# Patient Record
Sex: FEMALE | Race: WHITE | Employment: STUDENT | URBAN - METROPOLITAN AREA
[De-identification: names, ages, dates, MRNs, and addresses within clinical notes are randomized per-mention and may not be internally consistent; named-entity substitution may affect disease eponyms.]

---
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
## 2020-01-24 ENCOUNTER — OFFICE VISIT (OUTPATIENT)
Dept: UROLOGY | Facility: HOSPITAL | Age: 20
End: 2020-01-24
Attending: OBSTETRICS & GYNECOLOGY
Payer: COMMERCIAL

## 2020-01-24 VITALS
HEIGHT: 68 IN | BODY MASS INDEX: 20.92 KG/M2 | WEIGHT: 138 LBS | DIASTOLIC BLOOD PRESSURE: 66 MMHG | SYSTOLIC BLOOD PRESSURE: 102 MMHG

## 2020-01-24 DIAGNOSIS — N94.10 DYSPAREUNIA, FEMALE: ICD-10-CM

## 2020-01-24 DIAGNOSIS — R39.15 URINARY URGENCY: ICD-10-CM

## 2020-01-24 DIAGNOSIS — R35.1 NOCTURIA: ICD-10-CM

## 2020-01-24 DIAGNOSIS — R35.0 URINARY FREQUENCY: Primary | ICD-10-CM

## 2020-01-24 LAB
CONTROL RUN WITHIN 24 HOURS?: YES
LEUKOCYTE ESTERASE URINE: NEGATIVE
NITRITE URINE: NEGATIVE

## 2020-01-24 PROCEDURE — 99212 OFFICE O/P EST SF 10 MIN: CPT

## 2020-01-24 PROCEDURE — 87086 URINE CULTURE/COLONY COUNT: CPT | Performed by: OBSTETRICS & GYNECOLOGY

## 2020-01-24 PROCEDURE — 81002 URINALYSIS NONAUTO W/O SCOPE: CPT

## 2020-01-24 NOTE — PROGRESS NOTES
Caro Antonio,   1/24/2020     Referred by Dr. Jeffery Garcia  Pt here with mother    Patient presents with:  Bladder Problem: bladder pain  Urinary Frequency    She c/o urinary urgency and frequency, sx worse before her cycle  Pain as her bladder fills needed. , Disp: , Rfl: 0  Amitriptyline HCl 75 MG Oral Tab, Take 75 mg by mouth., Disp: , Rfl: 0    No facility-administered medications prior to visit.        Urogynecology Summary:  Urogynecology Summary  Prolapse: No  CAROL: No  Urge Incontinence: Yes  No and nonpharmacologic mgmt options for urinary symptoms. Discussed dietary & weight management with potential improvements in symptoms with weight loss.     Diagnostic Items:  urine culture (ua when not on cycle)    Medications Discussed:  Fiber  Miralax

## 2020-01-27 RX ORDER — AMITRIPTYLINE HYDROCHLORIDE 100 MG/1
TABLET, FILM COATED ORAL
COMMUNITY
Start: 2019-11-25 | End: 2020-02-03 | Stop reason: ALTCHOICE

## 2020-01-27 RX ORDER — SULFACETAMIDE SODIUM, SULFUR 98; 48 MG/G; MG/G
LOTION TOPICAL
COMMUNITY
Start: 2019-10-08 | End: 2021-02-12 | Stop reason: ALTCHOICE

## 2020-01-27 RX ORDER — METRONIDAZOLE 500 MG/1
TABLET ORAL
COMMUNITY
Start: 2019-10-06 | End: 2021-01-08 | Stop reason: ALTCHOICE

## 2020-01-27 RX ORDER — BUSPIRONE HYDROCHLORIDE 10 MG/1
TABLET ORAL
COMMUNITY
Start: 2019-12-17 | End: 2020-10-09 | Stop reason: ALTCHOICE

## 2020-01-27 RX ORDER — NORGESTIMATE AND ETHINYL ESTRADIOL 7DAYSX3 28
KIT ORAL
COMMUNITY
Start: 2019-12-25 | End: 2020-03-20

## 2020-01-27 RX ORDER — CICLOPIROX 1 G/100ML
SHAMPOO TOPICAL
COMMUNITY
Start: 2019-12-18

## 2020-02-03 ENCOUNTER — OFFICE VISIT (OUTPATIENT)
Dept: SURGERY | Facility: CLINIC | Age: 20
End: 2020-02-03
Payer: COMMERCIAL

## 2020-02-03 VITALS
BODY MASS INDEX: 20.92 KG/M2 | HEART RATE: 110 BPM | WEIGHT: 138 LBS | SYSTOLIC BLOOD PRESSURE: 97 MMHG | DIASTOLIC BLOOD PRESSURE: 66 MMHG | RESPIRATION RATE: 16 BRPM | OXYGEN SATURATION: 99 % | HEIGHT: 68 IN

## 2020-02-03 DIAGNOSIS — N64.52 BILATERAL NIPPLE DISCHARGE: Primary | ICD-10-CM

## 2020-02-03 PROCEDURE — 99204 OFFICE O/P NEW MOD 45 MIN: CPT | Performed by: SURGERY

## 2020-02-03 RX ORDER — SWAB
SWAB, NON-MEDICATED MISCELLANEOUS
COMMUNITY
End: 2021-02-12 | Stop reason: ALTCHOICE

## 2020-02-03 RX ORDER — CLINDAMYCIN PHOSPHATE AND BENZOYL PEROXIDE 10; 37.5 MG/G; MG/G
GEL TOPICAL
COMMUNITY
Start: 2020-01-23 | End: 2021-02-12 | Stop reason: ALTCHOICE

## 2020-02-03 RX ORDER — CLONAZEPAM 0.5 MG/1
TABLET, ORALLY DISINTEGRATING ORAL
COMMUNITY
Start: 2020-01-29 | End: 2021-01-08

## 2020-02-04 NOTE — PROGRESS NOTES
Breast Surgery New Patient Consultation    This is the first visit for this 23year old woman, referred by Dr. Delphine Romo, who presents for evaluation of nipple discharge.     History of Present Illness:   Ms. Odell Melchor is a 23year old woman who present Disp: , Rfl:   metRONIDAZOLE 500 MG Oral Tab, , Disp: , Rfl:   TRI FEMYNOR 0.18/0.215/0.25 MG-35 MCG Oral Tab, , Disp: , Rfl:   PLEXION 9.8-4.8 % External Lotion, APPLY TO face TWICE DAILY, Disp: , Rfl:   amphetamine-dextroamphetamine 10 MG Oral Tab, TAKE pain, chest pressure/discomfort, palpitations, irregular heartbeat, fainting or near-fainting, difficulty breathing when lying flat, SOB/Coughing at night, swelling of the legs or chest pain while walking.     Breasts:  See history of present illness    Gas kg/m²     Physical Exam:  The patient is an alert, oriented, well-nourished and  well-developed woman who appears her stated age. Her speech patterns and movements are normal. Her affect is appropriate. HEENT: The head is normocephalic.  The neck is supp nipple discharge. Discussion and Plan:  I had a discussion with the Patient and Family Member regarding her breast exam. On exam today I found expressible discharge from her right breast only with no other clinical findings.   This does not appear to be

## 2020-02-17 ENCOUNTER — OFFICE VISIT (OUTPATIENT)
Dept: OPTOMETRY | Facility: CLINIC | Age: 20
End: 2020-02-17
Payer: COMMERCIAL

## 2020-02-17 ENCOUNTER — OFFICE VISIT (OUTPATIENT)
Dept: PHYSICAL THERAPY | Age: 20
End: 2020-02-17
Attending: OBSTETRICS & GYNECOLOGY
Payer: COMMERCIAL

## 2020-02-17 DIAGNOSIS — N94.10 DYSPAREUNIA, FEMALE: ICD-10-CM

## 2020-02-17 DIAGNOSIS — H52.13 MYOPIA OF BOTH EYES: Primary | ICD-10-CM

## 2020-02-17 DIAGNOSIS — R35.1 NOCTURIA: ICD-10-CM

## 2020-02-17 DIAGNOSIS — R39.15 URINARY URGENCY: ICD-10-CM

## 2020-02-17 DIAGNOSIS — R35.0 URINARY FREQUENCY: ICD-10-CM

## 2020-02-17 PROCEDURE — 97112 NEUROMUSCULAR REEDUCATION: CPT

## 2020-02-17 PROCEDURE — 97162 PT EVAL MOD COMPLEX 30 MIN: CPT

## 2020-02-17 PROCEDURE — 97535 SELF CARE MNGMENT TRAINING: CPT

## 2020-02-17 PROCEDURE — 92015 DETERMINE REFRACTIVE STATE: CPT | Performed by: OPTOMETRIST

## 2020-02-17 PROCEDURE — 92012 INTRM OPH EXAM EST PATIENT: CPT | Performed by: OPTOMETRIST

## 2020-02-17 RX ORDER — PROPRANOLOL HYDROCHLORIDE 10 MG/1
TABLET ORAL
COMMUNITY
Start: 2020-02-03 | End: 2021-02-12 | Stop reason: ALTCHOICE

## 2020-02-17 NOTE — PROGRESS NOTES
Oni Aranda is a 23year old female. HPI:     HPI     Patient is in for an annual contact lens exam. She has been wearing [address] but they feel dry after 5 minutes of wear.  She is taking a lot of medications that are making her mouth Oral Tab      • TRI FEMYNOR 0.18/0.215/0.25 MG-35 MCG Oral Tab      • PLEXION 9.8-4.8 % External Lotion APPLY TO face TWICE DAILY     • amphetamine-dextroamphetamine 10 MG Oral Tab TAKE 1 TABLET BY MOUTH EVERY DAY AS NEEDED  0   • L-Methylfolate 7.5 MG [place] [facility] [place]    Right -2.25 -0.50 180 20/20    Left -2.25 -0.50 180 20/20          Final Rx       Sphere Cylinder [place] Dist VA    Right -2.25 -0.50 180 20/20    Left -2.25 -0.50 180 20/20    Type:  Single vision            Contact Lens Exam     Current Contact

## 2020-02-17 NOTE — ASSESSMENT & PLAN NOTE
New glasses RX given to update as needed. Gave trials of the [employer] Aqua Comfort Plus lenses and she will get back to me.

## 2020-02-17 NOTE — PATIENT INSTRUCTIONS
Myopia  New glasses RX given to update as needed. Gave trials of the [employer] Aqua Comfort Plus lenses and she will get back to me.

## 2020-02-17 NOTE — PROGRESS NOTES
PELVIC FLOOR EVALUATION:   Referring Physician: Dr. Yovany Taylor  Diagnosis:    Urinary frequency (R35.0)  Urinary urgency (R39.15)  Nocturia (R35.1)  Dyspareunia, female (N94.10)   Date of onset: chronic Date of Service: 2/17/2020     PATIENT SUMMARY   Au chronic  Abdominal/Vaginal Pressure complaints: no  Urinary Frequency: every hour  Urine Stream: varies  Amount: feels like she doesn't pee enough for what she drinks and vice versa  Leaking occurs: no  Episodes of Leakage: no  Amount of leakage: NA  Pad u Motion  Lumbar AROM screen: WFL  LE AROM screen: grossly WNL     Strength (MMT) 5/5 AFIA LE  Transverse Abdominis: 4/5    Flexibility Summary: WNL AFIA LE    Special Tests: WNL    Functional screen:  Double leg squat:wfl  Single leg squat:wfl  Single leg sta diaphragmatic breathing for PNS activation and pelvic floor relaxation     Patient was instructed in and issued a HEP for:     Charges: PT Eval Moderate Complexity, 1 self care, 1NM      Total Timed Treatment: 30 min     Total Treatment Time: 80 min     Ba If you have any questions, please contact me at Dept: [phone]    Sincerely,  Electronically signed by therapist: Cindy Amor, PT  [de-identified] certification required: Yes  I certify the need for these services furnished under this plan of treatment

## 2020-02-21 ENCOUNTER — TELEPHONE (OUTPATIENT)
Dept: OPTOMETRY | Facility: CLINIC | Age: 20
End: 2020-02-21

## 2020-02-27 ENCOUNTER — OFFICE VISIT (OUTPATIENT)
Dept: PHYSICAL THERAPY | Age: 20
End: 2020-02-27
Attending: OBSTETRICS & GYNECOLOGY
Payer: COMMERCIAL

## 2020-02-27 PROCEDURE — 97110 THERAPEUTIC EXERCISES: CPT

## 2020-02-27 PROCEDURE — 97140 MANUAL THERAPY 1/> REGIONS: CPT

## 2020-02-27 NOTE — PROGRESS NOTES
Dx: Urinary frequency (R35.0)  Urinary urgency (R39.15)  Nocturia (R35.1)  Dyspareunia, female (N94.10)          Insurance (Authorized # of Visits):  Cigna 10 per POC       Authorizing Physician: Dr. Burak Figueroa Next MD visit: none scheduled  Fall Risk: marisela HEP: no change    Charges: 2MAN, 1TE     Total Timed Treatment: 40 min  Total Treatment Time: 45 min

## 2020-03-02 ENCOUNTER — APPOINTMENT (OUTPATIENT)
Dept: PHYSICAL THERAPY | Age: 20
End: 2020-03-02
Attending: OBSTETRICS & GYNECOLOGY
Payer: COMMERCIAL

## 2020-03-05 ENCOUNTER — TELEPHONE (OUTPATIENT)
Dept: SURGERY | Facility: CLINIC | Age: 20
End: 2020-03-05

## 2020-03-05 ENCOUNTER — HOSPITAL ENCOUNTER (OUTPATIENT)
Dept: ULTRASOUND IMAGING | Facility: HOSPITAL | Age: 20
Discharge: HOME | End: 2020-03-05
Attending: SURGERY
Payer: COMMERCIAL

## 2020-03-05 DIAGNOSIS — N64.52 BILATERAL NIPPLE DISCHARGE: ICD-10-CM

## 2020-03-05 PROCEDURE — 76642 ULTRASOUND BREAST LIMITED: CPT | Performed by: SURGERY

## 2020-03-05 NOTE — TELEPHONE ENCOUNTER
Called pt to relay results. No answer, left VM after confirming verbal release. Per Dr. Alexander Barba, \"I reviewed and all looks ok. Have her see me for clinical exam only in 6 months. Nothing to do for the discharge right now. \"  Provided number to call for t

## 2020-03-12 ENCOUNTER — OFFICE VISIT (OUTPATIENT)
Dept: PHYSICAL THERAPY | Age: 20
End: 2020-03-12
Attending: OBSTETRICS & GYNECOLOGY
Payer: COMMERCIAL

## 2020-03-12 PROCEDURE — 97140 MANUAL THERAPY 1/> REGIONS: CPT

## 2020-03-12 PROCEDURE — 97110 THERAPEUTIC EXERCISES: CPT

## 2020-03-12 NOTE — PROGRESS NOTES
Dx: Urinary frequency (R35.0)  Urinary urgency (R39.15)  Nocturia (R35.1)  Dyspareunia, female (N94.10)          Insurance (Authorized # of Visits):  Cigna 10 per POC       Authorizing Physician: Dr. Jose Daniel Suarez Next MD visit: none scheduled  Fall Risk: marisela release to PF layers 1,2,3       NeuroMuscular Jeanine  Diaphragmatic breathing 3 min NeuroMuscular Jeanine  Diaphragmatic breathing 3 min      Self care Home management:       HEP: no change    Charges: 2MAN, 1TE     Total Timed Treatment: 40 min  Total Treatme

## 2020-03-16 ENCOUNTER — OFFICE VISIT (OUTPATIENT)
Dept: PHYSICAL THERAPY | Age: 20
End: 2020-03-16
Attending: OBSTETRICS & GYNECOLOGY
Payer: COMMERCIAL

## 2020-03-16 PROCEDURE — 97140 MANUAL THERAPY 1/> REGIONS: CPT

## 2020-03-16 NOTE — PROGRESS NOTES
Dx: Urinary frequency (R35.0)  Urinary urgency (R39.15)  Nocturia (R35.1)  Dyspareunia, female (N94.10)          Insurance (Authorized # of Visits):  Cigna 10 per POC       Authorizing Physician: Dr. Saulo Hanks MD visit: none scheduled  Fall Risk: marisela and TP release to PF layers 1,2,3  Manual:  DTM and TP release to PF layers 1,2,3   Bowel massage     NeuroMuscular Jeanine  Diaphragmatic breathing 3 min NeuroMuscular Jeanine  Diaphragmatic breathing 3 min      Self care Home management:       Connie Narayanan

## 2020-03-23 ENCOUNTER — APPOINTMENT (OUTPATIENT)
Dept: PHYSICAL THERAPY | Age: 20
End: 2020-03-23
Attending: OBSTETRICS & GYNECOLOGY
Payer: COMMERCIAL

## 2020-03-26 ENCOUNTER — TELEPHONE (OUTPATIENT)
Dept: PHYSICAL THERAPY | Age: 20
End: 2020-03-26

## 2020-03-30 ENCOUNTER — APPOINTMENT (OUTPATIENT)
Dept: PHYSICAL THERAPY | Age: 20
End: 2020-03-30
Attending: OBSTETRICS & GYNECOLOGY
Payer: COMMERCIAL

## 2020-04-06 ENCOUNTER — APPOINTMENT (OUTPATIENT)
Dept: PHYSICAL THERAPY | Age: 20
End: 2020-04-06
Attending: OBSTETRICS & GYNECOLOGY
Payer: COMMERCIAL

## 2020-04-24 ENCOUNTER — TELEPHONE (OUTPATIENT)
Dept: UROLOGY | Facility: HOSPITAL | Age: 20
End: 2020-04-24

## 2020-05-01 ENCOUNTER — VIRTUAL PHONE E/M (OUTPATIENT)
Dept: UROLOGY | Facility: HOSPITAL | Age: 20
End: 2020-05-01
Attending: OBSTETRICS & GYNECOLOGY
Payer: COMMERCIAL

## 2020-05-01 VITALS — BODY MASS INDEX: 20.92 KG/M2 | HEIGHT: 68 IN | WEIGHT: 138 LBS

## 2020-05-01 DIAGNOSIS — K59.00 CONSTIPATION, UNSPECIFIED CONSTIPATION TYPE: ICD-10-CM

## 2020-05-01 DIAGNOSIS — N94.10 DYSPAREUNIA, FEMALE: Primary | ICD-10-CM

## 2020-05-01 DIAGNOSIS — R39.15 URINARY URGENCY: ICD-10-CM

## 2020-05-01 DIAGNOSIS — R35.1 NOCTURIA: ICD-10-CM

## 2020-05-01 DIAGNOSIS — R35.0 URINARY FREQUENCY: ICD-10-CM

## 2020-05-01 NOTE — PATIENT INSTRUCTIONS
[facility]   [facility]    BOWEL REGIMEN    Constipation can have detrimental effects on bladder function and can worsen the symptoms of prolapse. It is important to avoid constipation.     The first step for treating co

## 2020-05-01 NOTE — PROGRESS NOTES
Patient presents to follow up nocturia, dyspareunia    She is currently using pelvic floor PT x4 (stopped given covid)    She reports 15% improvement, zachary with urinary frequency  Her biggest complaint now is painful intercourse  Goal currently is related t

## 2020-06-03 ENCOUNTER — APPOINTMENT (OUTPATIENT)
Dept: PHYSICAL THERAPY | Age: 20
End: 2020-06-03
Attending: OBSTETRICS & GYNECOLOGY
Payer: COMMERCIAL

## 2020-06-10 ENCOUNTER — APPOINTMENT (OUTPATIENT)
Dept: PHYSICAL THERAPY | Age: 20
End: 2020-06-10
Attending: OBSTETRICS & GYNECOLOGY
Payer: COMMERCIAL

## 2020-09-01 ENCOUNTER — OFFICE VISIT (OUTPATIENT)
Dept: PODIATRY CLINIC | Facility: CLINIC | Age: 20
End: 2020-09-01
Payer: COMMERCIAL

## 2020-09-01 DIAGNOSIS — L60.0 INGROWN TOENAIL OF BOTH FEET: Primary | ICD-10-CM

## 2020-09-01 PROCEDURE — 99202 OFFICE O/P NEW SF 15 MIN: CPT | Performed by: PODIATRIST

## 2020-09-01 RX ORDER — BUPROPION HYDROCHLORIDE 150 MG/1
150 TABLET ORAL EVERY MORNING
COMMUNITY
Start: 2020-03-24 | End: 2020-10-09

## 2020-09-01 RX ORDER — GUANFACINE 2 MG/1
2 TABLET, EXTENDED RELEASE ORAL DAILY
COMMUNITY
Start: 2020-07-14 | End: 2021-01-08

## 2020-09-01 NOTE — PROGRESS NOTES
HPI:    Patient ID: Renee Zimmer is a 23year old female. This pleasant 63-year-old female presents as a new patient to me and is accompanied by her mom. They state that they are self-referred.   The reason for this visit is chronic pain associated daily.     • buPROPion HCl ER, XL, 150 MG Oral Tablet 24 Hr Take 150 mg by mouth every morning. Allergies:No Known Allergies   PHYSICAL EXAM:   Physical exam pulses are normal.  There is no evidence of edema nor erythema.   There is marked incurvation

## 2020-09-08 ENCOUNTER — OFFICE VISIT (OUTPATIENT)
Dept: PODIATRY CLINIC | Facility: CLINIC | Age: 20
End: 2020-09-08
Payer: COMMERCIAL

## 2020-09-08 VITALS — SYSTOLIC BLOOD PRESSURE: 90 MMHG | HEART RATE: 101 BPM | DIASTOLIC BLOOD PRESSURE: 58 MMHG

## 2020-09-08 DIAGNOSIS — L60.0 INGROWN TOENAIL OF BOTH FEET: Primary | ICD-10-CM

## 2020-09-08 PROCEDURE — 11750 EXCISION NAIL&NAIL MATRIX: CPT | Performed by: PODIATRIST

## 2020-09-08 PROCEDURE — 3078F DIAST BP <80 MM HG: CPT | Performed by: PODIATRIST

## 2020-09-08 PROCEDURE — 3074F SYST BP LT 130 MM HG: CPT | Performed by: PODIATRIST

## 2020-09-08 NOTE — PROCEDURES
Per verbal order from SCR, draw up 1.5ml of 0.5% Marcaine and 1.5ml of 2% lidocaine for injection to bilateral great toes Jay Lee RN  Patient left office prior to obtaining post procedure vitals.

## 2020-09-08 NOTE — PROGRESS NOTES
HPI:    Patient ID: Rosemarie Mitchell is a 23year old female. 25year-old female presents for correction of ingrown toenail. I reviewed the procedures and after discussion patient signed a written consent.     ROS:   I reviewed medical status medicati borders of each great toe. After partial avulsion phenol was applied to the matrix area followed by a Betadine ointment dressing. She was given instruction to keep them dry for 1 day and then begin soaking and apply a Band-Aid.   She is well-informed and

## 2020-09-10 ENCOUNTER — TELEPHONE (OUTPATIENT)
Dept: PODIATRY CLINIC | Facility: CLINIC | Age: 20
End: 2020-09-10

## 2020-09-10 NOTE — TELEPHONE ENCOUNTER
Pt needs f/u at [facility] next week post procedure - no openings

## 2020-09-18 ENCOUNTER — OFFICE VISIT (OUTPATIENT)
Dept: PODIATRY CLINIC | Facility: CLINIC | Age: 20
End: 2020-09-18
Payer: COMMERCIAL

## 2020-09-18 DIAGNOSIS — L60.0 INGROWN TOENAIL OF BOTH FEET: Primary | ICD-10-CM

## 2020-09-18 PROCEDURE — 99024 POSTOP FOLLOW-UP VISIT: CPT | Performed by: PODIATRIST

## 2020-09-18 NOTE — PROGRESS NOTES
HPI:    Patient ID: Caden Lane is a 23year old female. This 22-year-old female presents post ingrown toenail procedure of both great toes. Patient has some discomfort.   On evaluation slight draining noted is consistent with the procedure there i orders of the defined types were placed in this encounter.       Meds This Visit:  Requested Prescriptions      No prescriptions requested or ordered in this encounter       Imaging & Referrals:  None       NQ#[id number]

## 2020-10-09 ENCOUNTER — OFFICE VISIT (OUTPATIENT)
Dept: PODIATRY CLINIC | Facility: CLINIC | Age: 20
End: 2020-10-09
Payer: COMMERCIAL

## 2020-10-09 VITALS — BODY MASS INDEX: 17.03 KG/M2 | WEIGHT: 115 LBS | HEIGHT: 69 IN

## 2020-10-09 DIAGNOSIS — L60.0 INGROWN TOENAIL OF BOTH FEET: Primary | ICD-10-CM

## 2020-10-09 PROCEDURE — 99212 OFFICE O/P EST SF 10 MIN: CPT | Performed by: PODIATRIST

## 2020-10-09 PROCEDURE — 3008F BODY MASS INDEX DOCD: CPT | Performed by: PODIATRIST

## 2020-10-09 RX ORDER — BUPROPION HYDROCHLORIDE 100 MG/1
100 TABLET, EXTENDED RELEASE ORAL EVERY MORNING
COMMUNITY
Start: 2020-09-29

## 2020-10-09 RX ORDER — CLONIDINE HYDROCHLORIDE 0.1 MG/1
TABLET ORAL
COMMUNITY
Start: 2020-09-29

## 2020-10-09 NOTE — PROGRESS NOTES
HPI:    Patient ID: Christine Saenz is a 23year old female. 25year-old female presents post ingrown toenail procedure of both great toes. She is accompanied today by her mom.   She has no noted complaints    ROS:              Current Outpatient Med recurrence. She is well-informed and is discharged today       ASSESSMENT/PLAN:   Ingrown toenail of both feet  (primary encounter diagnosis)    No orders of the defined types were placed in this encounter.       Meds This Visit:  Requested Prescriptions

## 2021-01-08 ENCOUNTER — OFFICE VISIT (OUTPATIENT)
Dept: INTERNAL MEDICINE CLINIC | Facility: CLINIC | Age: 21
End: 2021-01-08
Payer: COMMERCIAL

## 2021-01-08 VITALS
RESPIRATION RATE: 17 BRPM | DIASTOLIC BLOOD PRESSURE: 76 MMHG | SYSTOLIC BLOOD PRESSURE: 109 MMHG | WEIGHT: 109 LBS | HEART RATE: 92 BPM | HEIGHT: 69 IN | BODY MASS INDEX: 16.14 KG/M2

## 2021-01-08 DIAGNOSIS — R63.6 UNDERWEIGHT: ICD-10-CM

## 2021-01-08 DIAGNOSIS — Z23 NEED FOR VACCINATION: ICD-10-CM

## 2021-01-08 DIAGNOSIS — Z11.3 SCREENING EXAMINATION FOR STD (SEXUALLY TRANSMITTED DISEASE): ICD-10-CM

## 2021-01-08 DIAGNOSIS — Z00.00 PHYSICAL EXAM: Primary | ICD-10-CM

## 2021-01-08 PROCEDURE — 3074F SYST BP LT 130 MM HG: CPT | Performed by: INTERNAL MEDICINE

## 2021-01-08 PROCEDURE — 99395 PREV VISIT EST AGE 18-39: CPT | Performed by: INTERNAL MEDICINE

## 2021-01-08 PROCEDURE — 3008F BODY MASS INDEX DOCD: CPT | Performed by: INTERNAL MEDICINE

## 2021-01-08 PROCEDURE — 3078F DIAST BP <80 MM HG: CPT | Performed by: INTERNAL MEDICINE

## 2021-01-08 PROCEDURE — 90471 IMMUNIZATION ADMIN: CPT | Performed by: INTERNAL MEDICINE

## 2021-01-08 PROCEDURE — 90686 IIV4 VACC NO PRSV 0.5 ML IM: CPT | Performed by: INTERNAL MEDICINE

## 2021-01-08 RX ORDER — DOXYCYCLINE 40 MG/1
CAPSULE ORAL
COMMUNITY
Start: 2020-12-29 | End: 2021-02-12 | Stop reason: ALTCHOICE

## 2021-01-08 NOTE — PROGRESS NOTES
Gio Rodriguez is a 21year old female.   Patient presents with:  Physical      HPI:   Pt here for physical by-- here with mom  C/c physical  C/o Noted her wt -- she states she broke up with her boyfriend - and at that time did not have an appetite   Sh DAY 84 tablet 0   • Propranolol HCl 10 MG Oral Tab TAKE 1 TABLET BY MOUTH TWICE A DAY AS DIRECTED     • ONEXTON 1.2-3.75 % External Gel LOUIS TO FOREHEAD QAM     • Vitamin D, Cholecalciferol, 10 MCG (400 UNIT) Oral Cap Take by mouth.      • Ciclopirox 1 % Ext on meds     EXAM:   /76 (BP Location: Right arm, Patient Position: Sitting, Cuff Size: adult)   Pulse 92   Resp 17   Ht 5' 9\" (1.753 m)   Wt 109 lb (49.4 kg)   BMI 16.10 kg/m²   GENERAL: well developed, well nourished,in no apparent distress  SKIN:

## 2021-01-27 ENCOUNTER — LAB ENCOUNTER (OUTPATIENT)
Dept: LAB | Age: 21
End: 2021-01-27
Attending: INTERNAL MEDICINE
Payer: COMMERCIAL

## 2021-01-27 DIAGNOSIS — Z00.00 PHYSICAL EXAM: ICD-10-CM

## 2021-01-27 DIAGNOSIS — Z11.3 SCREENING EXAMINATION FOR STD (SEXUALLY TRANSMITTED DISEASE): ICD-10-CM

## 2021-01-27 LAB
ALBUMIN SERPL-MCNC: 3.9 G/DL (ref 3.4–5)
ALBUMIN/GLOB SERPL: 1 {RATIO} (ref 1–2)
ALP LIVER SERPL-CCNC: 45 U/L
ALT SERPL-CCNC: 28 U/L
ANION GAP SERPL CALC-SCNC: 8 MMOL/L (ref 0–18)
AST SERPL-CCNC: 19 U/L (ref 15–37)
BASOPHILS # BLD AUTO: 0.02 X10(3) UL (ref 0–0.2)
BASOPHILS NFR BLD AUTO: 0.3 %
BILIRUB SERPL-MCNC: 0.3 MG/DL (ref 0.1–2)
BUN BLD-MCNC: 6 MG/DL (ref 7–18)
BUN/CREAT SERPL: 7.5 (ref 10–20)
CALCIUM BLD-MCNC: 9.6 MG/DL (ref 8.5–10.1)
CHLORIDE SERPL-SCNC: 105 MMOL/L (ref 98–112)
CHOLEST SMN-MCNC: 222 MG/DL (ref ?–200)
CO2 SERPL-SCNC: 24 MMOL/L (ref 21–32)
CREAT BLD-MCNC: 0.8 MG/DL
DEPRECATED RDW RBC AUTO: 41.5 FL (ref 35.1–46.3)
EOSINOPHIL # BLD AUTO: 0.09 X10(3) UL (ref 0–0.7)
EOSINOPHIL NFR BLD AUTO: 1.1 %
ERYTHROCYTE [DISTWIDTH] IN BLOOD BY AUTOMATED COUNT: 12.3 % (ref 11–15)
GLOBULIN PLAS-MCNC: 3.9 G/DL (ref 2.8–4.4)
GLUCOSE BLD-MCNC: 88 MG/DL (ref 70–99)
HCT VFR BLD AUTO: 42.3 %
HDLC SERPL-MCNC: 63 MG/DL (ref 40–59)
HGB BLD-MCNC: 13.7 G/DL
IMM GRANULOCYTES # BLD AUTO: 0.01 X10(3) UL (ref 0–1)
IMM GRANULOCYTES NFR BLD: 0.1 %
LDLC SERPL CALC-MCNC: 113 MG/DL (ref ?–100)
LYMPHOCYTES # BLD AUTO: 3.65 X10(3) UL (ref 1–4)
LYMPHOCYTES NFR BLD AUTO: 46.6 %
M PROTEIN MFR SERPL ELPH: 7.8 G/DL (ref 6.4–8.2)
MCH RBC QN AUTO: 30 PG (ref 26–34)
MCHC RBC AUTO-ENTMCNC: 32.4 G/DL (ref 31–37)
MCV RBC AUTO: 92.6 FL
MONOCYTES # BLD AUTO: 0.35 X10(3) UL (ref 0.1–1)
MONOCYTES NFR BLD AUTO: 4.5 %
NEUTROPHILS # BLD AUTO: 3.71 X10 (3) UL (ref 1.5–7.7)
NEUTROPHILS # BLD AUTO: 3.71 X10(3) UL (ref 1.5–7.7)
NEUTROPHILS NFR BLD AUTO: 47.4 %
NONHDLC SERPL-MCNC: 159 MG/DL (ref ?–130)
OSMOLALITY SERPL CALC.SUM OF ELEC: 281 MOSM/KG (ref 275–295)
PATIENT FASTING Y/N/NP: YES
PATIENT FASTING Y/N/NP: YES
PLATELET # BLD AUTO: 240 10(3)UL (ref 150–450)
POTASSIUM SERPL-SCNC: 4.3 MMOL/L (ref 3.5–5.1)
RBC # BLD AUTO: 4.57 X10(6)UL
SODIUM SERPL-SCNC: 137 MMOL/L (ref 136–145)
T3FREE SERPL-MCNC: 2.68 PG/ML (ref 2.4–4.2)
TRIGL SERPL-MCNC: 231 MG/DL (ref 30–149)
TSI SER-ACNC: 2.58 MIU/ML (ref 0.36–3.74)
VLDLC SERPL CALC-MCNC: 46 MG/DL (ref 0–30)
WBC # BLD AUTO: 7.8 X10(3) UL (ref 4–11)

## 2021-01-27 PROCEDURE — 80053 COMPREHEN METABOLIC PANEL: CPT

## 2021-01-27 PROCEDURE — 85025 COMPLETE CBC W/AUTO DIFF WBC: CPT

## 2021-01-27 PROCEDURE — 87591 N.GONORRHOEAE DNA AMP PROB: CPT

## 2021-01-27 PROCEDURE — 84481 FREE ASSAY (FT-3): CPT

## 2021-01-27 PROCEDURE — 80061 LIPID PANEL: CPT

## 2021-01-27 PROCEDURE — 84443 ASSAY THYROID STIM HORMONE: CPT

## 2021-01-27 PROCEDURE — 87491 CHLMYD TRACH DNA AMP PROBE: CPT

## 2021-01-27 PROCEDURE — 36415 COLL VENOUS BLD VENIPUNCTURE: CPT

## 2021-01-28 LAB
C TRACH DNA SPEC QL NAA+PROBE: NEGATIVE
N GONORRHOEA DNA SPEC QL NAA+PROBE: NEGATIVE

## 2021-03-12 ENCOUNTER — OFFICE VISIT (OUTPATIENT)
Dept: UROLOGY | Facility: HOSPITAL | Age: 21
End: 2021-03-12
Attending: OBSTETRICS & GYNECOLOGY
Payer: COMMERCIAL

## 2021-03-12 VITALS — BODY MASS INDEX: 16.14 KG/M2 | HEIGHT: 69 IN | WEIGHT: 109 LBS

## 2021-03-12 DIAGNOSIS — K59.00 CONSTIPATION, UNSPECIFIED CONSTIPATION TYPE: ICD-10-CM

## 2021-03-12 DIAGNOSIS — N94.10 DYSPAREUNIA, FEMALE: Primary | ICD-10-CM

## 2021-03-12 PROCEDURE — 99212 OFFICE O/P EST SF 10 MIN: CPT

## 2021-03-12 RX ORDER — DIAZEPAM 10 MG/1
10 TABLET ORAL NIGHTLY
Qty: 30 TABLET | Refills: 1 | Status: SHIPPED | OUTPATIENT
Start: 2021-03-12

## 2021-03-12 NOTE — PROGRESS NOTES
Patient presents to follow up dyspareunia  Here with her mother    She is currently using - had PT in the past, denies significant relief      H/o anxiety and depression with ADHD  Biggest bother is urinary sx with intercourse    Recent STI testing with gy

## 2021-03-12 NOTE — PROGRESS NOTES
[facility] pharmacy called spoke with the pharmacists, [place]. Pharmacist wanting to know if ordering physician aware of medications patient is currently taking orally. TORB Dr. Keith Christiansen with VAGINAL valium nightly.

## 2021-04-21 ENCOUNTER — OFFICE VISIT (OUTPATIENT)
Dept: PHYSICAL THERAPY | Age: 21
End: 2021-04-21
Attending: OBSTETRICS & GYNECOLOGY
Payer: COMMERCIAL

## 2021-04-21 DIAGNOSIS — N94.10 DYSPAREUNIA, FEMALE: ICD-10-CM

## 2021-04-21 PROCEDURE — 97161 PT EVAL LOW COMPLEX 20 MIN: CPT

## 2021-04-21 PROCEDURE — 97110 THERAPEUTIC EXERCISES: CPT

## 2021-04-21 NOTE — PROGRESS NOTES
PELVIC FLOOR EVALUATION:   Referring Physician: Dr. Simon Palafox  Diagnosis: Dyspareunia   Date of onset: Chronic Date of Service: 4/21/2021     PATIENT SUMMARY   Gio Rodriguez is a 21year old female  who presents to therapy today with complaints of Fletcher Stool Scale: 4  Do you strain with defecation: Yes   Laxative/Stool softener use: Yes, Benefiber 1x, Miralax 1x in coffee/tea    [facility]  History of Sexual Abuse: No  Sexual [place] Status: not participating   Pain with initial and restriction moderate restriction    Deep Transverse Perineal moderate restriction moderate restriction    Compress Urethrae/Spinc. Urethrovaginalis   moderate restriction moderate restriction    Pubococcygeus/Pubovaginalis minimal restriction minimal restr symptoms. Frequency / Duration: Patient will be seen for  1x/week or a total of 10 visits over a 90 day period.   Treatment will include: Neuromuscular Re-education, Therapeutic Exercise and Home Exercise Program instruction     Education or treatment li

## 2021-04-26 ENCOUNTER — OFFICE VISIT (OUTPATIENT)
Dept: PHYSICAL THERAPY | Age: 21
End: 2021-04-26
Attending: OBSTETRICS & GYNECOLOGY
Payer: COMMERCIAL

## 2021-04-26 DIAGNOSIS — N94.10 DYSPAREUNIA, FEMALE: ICD-10-CM

## 2021-04-26 PROCEDURE — 97140 MANUAL THERAPY 1/> REGIONS: CPT

## 2021-04-26 NOTE — PROGRESS NOTES
Dx:    Dyspareunia, female (N94.10)      Insurance (Authorized # of Visits):  Cigna 10 per POC         Authorizing Physician: Dr. Kvng Armenta  Next MD visit: none scheduled  Fall Risk: standard         Precautions: n/a             Subjective: Got dilators.  D

## 2021-04-28 ENCOUNTER — APPOINTMENT (OUTPATIENT)
Dept: PHYSICAL THERAPY | Age: 21
End: 2021-04-28
Payer: COMMERCIAL

## 2021-05-05 ENCOUNTER — OFFICE VISIT (OUTPATIENT)
Dept: PHYSICAL THERAPY | Age: 21
End: 2021-05-05
Attending: INTERNAL MEDICINE
Payer: COMMERCIAL

## 2021-05-05 PROCEDURE — 97140 MANUAL THERAPY 1/> REGIONS: CPT

## 2021-05-05 NOTE — PROGRESS NOTES
Dx:    Dyspareunia, female (N94.10)      Insurance (Authorized # of Visits):  Cigna 10 per POC         Authorizing Physician: Dr. Jamaal Keller Next MD visit: none scheduled  Fall Risk: standard         Precautions: n/a           Subjective: Have not used dila

## 2021-05-10 ENCOUNTER — APPOINTMENT (OUTPATIENT)
Dept: PHYSICAL THERAPY | Age: 21
End: 2021-05-10
Payer: COMMERCIAL

## 2021-05-12 ENCOUNTER — OFFICE VISIT (OUTPATIENT)
Dept: PHYSICAL THERAPY | Age: 21
End: 2021-05-12
Attending: INTERNAL MEDICINE
Payer: COMMERCIAL

## 2021-05-12 PROCEDURE — 97140 MANUAL THERAPY 1/> REGIONS: CPT

## 2021-05-12 NOTE — PROGRESS NOTES
ProgressSummary  Pt has attended 4 visits in Physical therapy.    Dx:    Dyspareunia, female (N94.10)      Insurance (Authorized # of Visits):  Cigna 10 per POC         Authorizing Physician: Dr. Reece Avila Next MD visit: none scheduled  Fall Risk: stand 90 day period. Treatment will include: manual work, downtraining , ROM of PF       Patient/Family/Caregiver was advised of these findings, precautions, and treatment options and has agreed to actively participate in planning and for this course of care.

## 2021-05-14 ENCOUNTER — OFFICE VISIT (OUTPATIENT)
Dept: UROLOGY | Facility: HOSPITAL | Age: 21
End: 2021-05-14
Attending: OBSTETRICS & GYNECOLOGY
Payer: COMMERCIAL

## 2021-05-14 VITALS — RESPIRATION RATE: 20 BRPM | HEIGHT: 69 IN | WEIGHT: 109 LBS | TEMPERATURE: 99 F | BODY MASS INDEX: 16.14 KG/M2

## 2021-05-14 DIAGNOSIS — N94.10 DYSPAREUNIA, FEMALE: Primary | ICD-10-CM

## 2021-05-14 DIAGNOSIS — R39.15 URINARY URGENCY: ICD-10-CM

## 2021-05-14 DIAGNOSIS — R35.0 URINARY FREQUENCY: ICD-10-CM

## 2021-05-14 DIAGNOSIS — K59.00 CONSTIPATION, UNSPECIFIED CONSTIPATION TYPE: ICD-10-CM

## 2021-05-14 PROCEDURE — 99212 OFFICE O/P EST SF 10 MIN: CPT

## 2021-05-14 NOTE — PROGRESS NOTES
Patient presents to follow up dyspareunia    She is currently using pelvic floor PT  Here with her mother  Not currently sexually active    She reports unclear improvement   Doing dilator work at home  Bowels improved with fiber & miralax  Some sense of in

## 2021-05-17 ENCOUNTER — APPOINTMENT (OUTPATIENT)
Dept: PHYSICAL THERAPY | Age: 21
End: 2021-05-17
Payer: COMMERCIAL

## 2021-05-19 ENCOUNTER — OFFICE VISIT (OUTPATIENT)
Dept: PHYSICAL THERAPY | Age: 21
End: 2021-05-19
Attending: INTERNAL MEDICINE
Payer: COMMERCIAL

## 2021-05-19 PROCEDURE — 97140 MANUAL THERAPY 1/> REGIONS: CPT

## 2021-05-19 NOTE — PROGRESS NOTES
Dx:    Dyspareunia, female (N94.10)      Insurance (Authorized # of Visits):  Cigna 10 per POC         Authorizing Physician: Dr. Krista Orellana Next MD visit: none scheduled  Fall Risk: standard         Precautions: n/a           Subjective: Saw doctor.  Unsu

## 2021-05-27 ENCOUNTER — OFFICE VISIT (OUTPATIENT)
Dept: PHYSICAL THERAPY | Age: 21
End: 2021-05-27
Attending: INTERNAL MEDICINE
Payer: COMMERCIAL

## 2021-05-27 PROCEDURE — 97140 MANUAL THERAPY 1/> REGIONS: CPT

## 2021-05-27 NOTE — PROGRESS NOTES
Dx:    Dyspareunia, female (N94.10)      Insurance (Authorized # of Visits):  Cigna 10 per POC         Authorizing Physician: Dr. Reece Avila Next MD visit: none scheduled  Fall Risk: standard         Precautions: n/a           Subjective:  Using dilator a

## 2021-06-14 ENCOUNTER — OFFICE VISIT (OUTPATIENT)
Dept: PHYSICAL THERAPY | Age: 21
End: 2021-06-14
Attending: INTERNAL MEDICINE
Payer: COMMERCIAL

## 2021-06-14 PROCEDURE — 97140 MANUAL THERAPY 1/> REGIONS: CPT

## 2021-06-14 NOTE — PROGRESS NOTES
Dx:  Dyspareunia, female (N94.10)      Insurance (Authorized # of Visits):  Cigna 10 per POC         Authorizing Physician: Dr. Jamaal Keller Next MD visit: none scheduled  Fall Risk: standard         Precautions: n/a           Subjective: On last dilator.   B

## 2022-02-15 PROBLEM — F31.2 BIPOLAR I, MOST RECENT EPISODE MANIC, SEVERE WITH PSYCHOTIC BEHAVIOR (HCC): Status: ACTIVE | Noted: 2022-02-15

## 2022-02-15 PROBLEM — F31.9 BIPOLAR DISORDER WITH PSYCHOTIC FEATURES (HCC): Status: ACTIVE | Noted: 2022-02-15

## 2022-02-15 NOTE — ED NOTES
Discuss the plan of care, admission process and reviewed rights. Patient agreed to voluntary behavioral health treatment and was provided a copy of all documents signed. A copy was scanned to EMR and originals were placed in the chart.      Patient gave verbal consent for parents to updated on where she transfers to:    [facility], [address] [place] [address], [phone]

## 2022-02-15 NOTE — BH LEVEL OF CARE ASSESSMENT
Crisis Evaluation Assessment    Joanna Lewis YOB: 2000   Age 24year old MRN [id number]   Location [address] Attending Jina Wen      Patient's legal sex: female  Patient identifies as: female  Patient's birth sex: female  Preferred pronouns: she/her    Date of Service: 2/14/2022    Referral Source:  Referral Source  Referral Source: Legal  Legal: Other  Organization Name: EMS     Reason for Crisis Evaluation   Patient presents to the ED for psychiatric evaluation arriving via EMS. Family was concerned because the patient stopped her medication about 3-4 days ago and had a dissociative experience while driving. Patient was receiving messages from the [place] about trying to find a \"soul mate\" who she didn't have any contact information for. Patient got lost for several hours, was confused and ran out of gas. Patient reports a past hx of depression, anxiety and ADHD and how the medications she was on were making her feel incredible numb which led to increased sadness. Patient said since stopping the medications she has been feeling happy, having positive thoughts and has a new outlook on life. \"I was talking about astrology, the universe and my soul mate. My heart is feeling love for the first time. My intuition is showing me there is hope and I don't have anxiety anymore. It was just really love. I am learning life lessons. I was reborn and I am not controlled anymore. I can feel again, I missed her. She hasn't come out since I was a kid. I was focusing on negative and hate all the time. Life is so beautiful and I havent experienced it yet. \"     Patient does recognize how she isn't able to express all the thoughts she has been having well because they are racing but also she can see why her family would be worried. She said that she struggles to communicate and is better with feeling things and showing them.              Collateral  Mahnaz Alexis Arriaga, sister, [phone]  Lia Worley, mother, [phone]    Family reports that the patient started to display some bizarre behavior that has been becoming more concerning in the past week. Firstly, patient was mentioning how she \"was connected to all others who have ADHD and has access to their infinite knowledge in her own head, all the people currently and the people who have come before her\". She also have been telling the family and posting on [employer] that she is a reincarnated spirit/guardian who has been sent here and in the past for many lifetimes to heal those she loves. Family reports that she randomly started speaking about the universe sending her signs and is communicating to her through music. Yesterday, patient said that she was going to go to her soulmate (someone she used to work with, has no phone # or address) and was going to let the universe guide her. She ended up getting lost and messaged her mother the location of where she was which wasn't where she actually was found later. Family said, \"her mind couldn't process it. She was in no state to be driving. \". Patient ended up in someone's driveway and told the family that it wasn't the right time and she needed to better herself for him. Family also said that this time that the patient was interpreting signs from music, a message from [employer] and the warning signals on the dashboard of the car (on-going issue) were all coming together to help the patient perceive a message about what she needed to do. Patient was driving some more and mother was concerned because she knows how to use her phone but at the time couldn't have enough insight to use it. Sister reached out to the father who followed up with her. He was considered because when she was talking she wasn't making any sense and eventually ran out of gas while in this state. Family decided to contact EMS to have her evaluated.  Today, patient was telling her family that it was time to leave and \"listen to her heart and let the universe guide her where she needed to go\". Patient was repeatedly listening to the same song over again which she told her sister was a sign from her soulmate and they were connected. Family was concerned because the patient doesn't have any contact with this person and he isn't aware of any of this happening. Sister said that she was following a mental health page on [employer] that using a lightning bolt as their symbol. Patient took that symbol to mean there's a spark between her and her soulmate. Family said that sometime in the past week, the patient had stopped all of her medications but aren't sure when. Family said that the patient has been mentioning it but her mother encouraged her to wait for her appointment which is scheduled for this week. In the past few weeks, they have noticed that the patient hasn't been sleeping and has been obsessively cleaning various areas of her room. Also, her memory has worsened, hasn't slept in the past 3 days and has been hallucinating. She also told her family that she can \"sense her soul mates's senses physically\". Mother reports a hx of PHP programing and is working with [facility] for outpatient medication management. In addition to not sleeping, patient hasn't been eating much. Patient has made some mention of wanting to kill herself after mother was challenging her delusional thoughts. Patient reports talking to her brain and family reports that she has sit and stare for extended periods of times without responding to others. Family is concerned about the patient's safety and doesn't think that can maintain her safety. Family completed petition. Risk to Self or Others  Patient denies having HI thoughts and denies hallucinations and delusions.      Patient did endorses that she gets \"signs from the [place]" but that talking about it that unless this writer has the same thought process, it would not make sense. Patient said that music/song will show her what she needs to hear and it is for her to heal like therapy. Patient denies agitation/aggression but did reports having some situational irritation towards mother because she doesn't believe she is supporting her. Since going off her medications, patient reports that she has been better able to care for herself in the past couple of days. Patient's family is concerned because the patient hasn't been sleeping and isn't eating in the past couple of days. Patient has various delusions and some grandiosity, impulsiveness and increased motivation for cleaning, Restorationism preoccupation (spirit guide to heal others) and believes that the universe communicates to her through song. Suicide Risk Assessments:    Source of information for CSSR: Patient;Collateral  In what setting is the screener performed?: in person  1. Have you wished you were dead or wished you could go to sleep and not wake up? (past 30 days): No  2. Have you actually had any thoughts of killing yourself? (past 30 days): Yes  3. Have you been thinking about how you might kill yourself? (past 30 days): No  4. Have you had these thoughts and had some intention of acting on them? (past 30 days): No  5a. Have you started to work out or worked out the details of how to kill yourself? (past 30 days): No  5b. Do you intend to carry out this plan? (past 30 days): No  6. Have you ever done anything, started to do anything, or prepared to do anything to end your life? (lifetime): No  7. How long ago did you do any of these?:  (Patient denies plans/intent or previous attempts.)  Score -  OV: 1- Low Risk   Describe : Patient denies passive or active SI thoughts/plans or recent attempts; Per family, patient said that she wanted to kill herself but didn't discuss a plan/intent or had a recent attempt.   Is your experience of thoughts of dying by suicide: A Solution to a Problem  Protective Factors: Limited support from family and friends  Past Suicidal Ideation: Ideation  Describe: Patient reports in middle school that she had some thoughts about wanting to end her life. Denies plans/intent or previous attempts. Family History or Personal Lived Experience of Loss or Near Loss by Suicide: Denies        Patient denies any passive or active thoughts/plans or recent attempts. Mother mentioned that the patient made an SI statement out of frustration in the past week. Patient denies any worsening anxiety and believes that her anxiety is a \"expression of love\". Patient reports some stress regarding the lack of support and understanding from family. Patient denies bullying/trauma/abuse hx. Non-Suicidal Self-Injury:   Patient denies any active self-harm behaviors. Endorses a hx of self-harm in which she used to cut her legs and arms with an exacto knife while she was in middle school. Patient said that she a triggered by the lack of support by family and said that because she had trouble expressing herself she would use cutting as a way to say that she needed up. Patient denies any recent urges or triggers. Patient did reports needing to have stitches due to an incident of self harm. Access to Means:  Access to Means  Has access to means to attempt suicide or harm others or property: Yes  Description of Access: household items  Discussion of Removal of Access to Means: Patient denies any SI/HI  Access to Firearm/Weapon: No  Discussion of Removal of Firearm/Weapon: Patient denies access to guns/weapons  Do you have a firearm owner ID card?: No    Protective Factors:   Protective Factors: Limited support from family and friends    Review of Psychiatric Systems:  Patient denies hallucinations and trauma. Patient reports a hx of depression and anxiety which have balance out now.  Patient about 4-6 days ago, she was [place] a mental breakdown and didn't know what to do but wanted to stop her medication\". Stephanie - did endorse feeling stephanie when taking adderall, racing thoughts, grandiosity, elevated self-esteem, Church preoccupation, decreased need for sleep and not sleeping much in the past couple of days. Appetite - decreased, not wanting to eat much in the past 3 days, but does feel that might be turning around because of going off her medication which is an appetite suppressant. Substance Use:  Cannabis - started using in HS and started again at 21, daily use with vape pen which has made her anxiety and depression improved. No current alcohol use/abuse; denies other drug use/abuse    UDS +amphetamines (prescription) and cannabis. BAL <3.             Functional Achievement:   Patient is not currently working or in school. Patient reports that her ADL's and hygiene have improved in the past couple of days. Current Treatment and Treatment History:  Patient reports hx of depression, anxiety and ADHD and has gone to outpatient programming through [facility]. Patient is not currently meeting with a therapist but has been working with Kya Lema, nurse practitioner for medication management. Patient is scheduled to see them this week however stopped taking her medications about 4 days ago. Patient is currently taking: adderall 30mg and 10mg (ER and short release); wellbutrin 100mg, guanfacine 3mg, clonzapam (as needed, I needed to take it to sleep, which I did; 1mg). Patient said that she is aware she impulsively stopped medications. Patient denies previous hospitalizations. Relevant Social History:  Patient reports some extended family mental health issues including: cousins with bipolar, ADHD, depression; sister has depression/anxiety and OCD. Patient is not , does not have children, denies legal hx and is livign with mother and sister.  Patient does have support system but isn't sure if they are truly there for her.           Sebas Guerrero and Complex (as applicable):                                    EDP Assessment (as applicable):  IBW Calculations  Weight: 110 lb                                                                    Abuse Assessment:  Abuse Assessment  Physical Abuse: Denies  Verbal Abuse: Denies  Sexual Abuse: Denies  Neglect: Denies  Does anyone say or do something to you that makes you feel unsafe?: No  Have You Ever Been Harmed by a Partner/Caregiver?: No  Health Concerns r/t Abuse: No  Possible Abuse Reportable to[de-identified] Not appropriate for reporting to authorities    Mental Status Exam:   General Appearance  Characteristics: Other (comment) (wearing hospital gown)  Eye Contact: Indirect  Psychomotor Behavior  Gait/Movement: Normal  Abnormal movements: None  Posture: Relaxed  Rate of Movement: Normal  Mood and Affect  Mood or Feelings: Calm  Appropriateness of Affect: Congruent to mood  Range of Affect: Normal  Stability of Affect: Stable  Attitude toward staff: Co-operative  Speech  Rate of Speech: Appropriate  Flow of Speech: Appropriate  Intensity of Volume: Ordinary  Clarity: Clear  Cognition  Concentration: Unimpaired  Memory: Recent memory intact; Remote memory intact  Orientation Level: Oriented X4  Insight: Fair  Judgment: Poor  Thought Patterns  Clarity/Relevance: Coherent  Flow: Organized; Flight of ideas;Blocking  Content: Grandiose;Delusions; Ideas of reference  Level of Consciousness: Alert  Level of Consciousness: Alert  Behavior  Exhibited behavior: Participated      Disposition:    Assessment Summary:   Patient is a 24 y.o. female who presents to the ED for psychiatric evaluation arriving via EMS due to concerns for changes in behavior which have been worsening in the past week.  Patient hasn't been sleeping, has decreased appetite, has increased energy that leads to obsessive cleaning during the night, delusions, Amish preoccupation, grandiosity and ideas of reference (messages from the universe guiding her through music). Patient decided to go off her medication about 4 days ago impulsively. Prior to this, patient was struggling with depression and anxiety as well as numbness and not being able to care for self. Patient denies SI/HI/AVH/self-harm. Patient endorses cannabis use. Patient reports hx of depression, anxiety and ADHD. Patient denies previous hospitalizations. Patient does not have therapist but does work with a nurse practitioner. Consulted with Dr. Jeffrey Brown. Inpatient is required for safety and stability. Risk/Protective Factors  Protective Factors: Limited support from family and friends    Level of Care Recommendations  Consulted with: Dr. Jeffrey Brown  Level of Care Recommendation: Inpatient Acute Care  Unit: [facility] (AIU)  Reason for Unit Assigned: age, sxs  Inpatient Criteria: 24 hr behavior monitoring; Failure at lowest level of care  Behavioral Precautions: Close Observation  Medical Precautions: None  Refused Treatment: No  Sign-In  Paperwork Signed: Patient Rights;Voluntary Admission Form  Patient Verbalized Understanding: Yes        Diagnoses:  Primary Psychiatric Diagnosis  F31.2 Bipolar Disorder, Manic, with Psychosis, Severe      Secondary Psychiatric Diagnoses  Deferred   Pervasive Diagnoses  Deferred   Pertinent Non-Psychiatric Diagnoses  Deferred        Mae MCGUIRE, LPC

## 2022-02-15 NOTE — ED QUICK NOTES
Dr Bruce Mansfield made aware that pt has been taking Aderall, guanfacine and Welbutrin for ADHD and depression, Dr Bruce Mansfield advise not to give those home medication

## 2022-02-15 NOTE — ED QUICK NOTES
EMS reports that mother endorsed to them that patient had not been taking psych medications as prescribed. Patient had a \"dissociative experience yesterday\" where Tatiana Varma went to meet a boy with no phone number or address. Eventually patient was found in [place] and father came to get patient. When patient was asked by her parents about yesterdays situation, patient told mother \"stop asking me, it makes me want to hurt myself\"\". EMS states mother is en route to ED.

## 2022-02-15 NOTE — ED INITIAL ASSESSMENT (HPI)
Patient brought in via ems for psych eval. Per EMS, patient was in argument with mother, mother called ems to report that patient had made SI statements to her. Patient is denying that she made such statements and denies any SI/HI/Halucinations. Hx depression and anxiety, is usually on medication for, stopped taking x 2 weeks ago.

## 2022-02-15 NOTE — ED PROVIDER NOTES
Patient endorsed to me in setting of new psychosis. No issues on my shift. Awaiting psychiatric transfer.

## 2022-02-15 NOTE — ED QUICK NOTES
Received report from [facility]. Pt admitted for manic behavior. Pt stopped taking her home medications about two weeks ago. Pt A&ox4. Pt calm and cooperative. NO SI/HI. No petition at this time. ROLAND at bedside.

## 2022-02-15 NOTE — ED QUICK NOTES
Med rec completed. Patient stating the only medication she has been taking is the adderall. States she was supposed to take wellbutrin and guanfecine, however has stopped taking those medications for the past 2 weeks.

## 2022-02-15 NOTE — ED NOTES
Patient wanted to talk to this writer regarding concerns about her admission. Patient is calm and appropriate but states that she believes her treatment would work better for her if she could complete it through outpatient. She understands the concerns that brought her here but wants to be at home in her bed (concerned about not sleeping well away from home) and wants support of her family. Patient said that she will still cooperate with the plan. This writer validated patient's frustrations and reviewed the plan of care. Patient is asking for a nicotine patch and chap stick. RN notified.

## 2022-02-15 NOTE — ED PROVIDER NOTES
Patient endorsed to me by Dr. Honorio Amos for continuation of care. Patient awaiting  crisis evaluation for psychosis. Patient has been calm throughout my shift. Pt was evaluated by crisis and is in need of inpt psych transfer. Inpatient certificate filed. Patient endorsed to Dr. Nichol Chatterjee for continuation of care.

## 2022-02-15 NOTE — PROGRESS NOTES
02/14/22 2202   COVID Exposure Risk Screening   Have you been practicing social distancing? Yes   Have you been wearing a mask when in the community? Yes   Are the people you live with following social distancing and wearing a mask? Yes   While you are work, do you have direct contact with co-workers or others in the community that may increase your risk of exposure? Not currently working  (Patient is vaccinated and boosted.)   If those you live with work, do they have direct contact with co-workers or others in the community that may increase their risk of exposure? Yes   Describe Mother - , 2x a week in person and works the other days from home, vaccinated and boosted; Sister - vaccinated and boosted, online school   Have you traveled or engaged in group activities in the past two weeks? No   Have you been engaging in any high-risk behaviors in the past two weeks that would have led to increased exposure risk? Yes   Describe Patient gets the cannabis from a dispensary for cannabis, does wear a mask and last went about 2 weeks ago. Per Wilver Kong RN Sup, no I/S concerns and can have a roommate.

## 2022-02-16 PROBLEM — F31.9 BIPOLAR DISORDER WITH PSYCHOTIC FEATURES (HCC): Status: RESOLVED | Noted: 2022-02-15 | Resolved: 2022-02-16

## 2022-02-16 NOTE — ED QUICK NOTES
Report given to Catrachita Peres RN, pt admitted by Dr Ivette Naranjo of [facility] [place] [facility] between 2000 to 2030

## 2022-02-16 NOTE — ED QUICK NOTES
Assisting primary RN. Patient to Jacques Chi. MD to put note in for patient to be medically cleared. Lorenza Wilburn, [facility] for RN to RN report.

## 2022-02-16 NOTE — BH PROGRESS NOTE
Dr. Douglas David has accepted pt to [facility] AIU unit, bed 823a. Writer gave SBAR to [employer]. Writer called [facility] ED, pt's RN Kylee Reaves on break, so writer provided Lolly with [employer] #. Also, requested that a dr put in a note that pt is medically cleared for inpatient psych. This note will not be shared with the patient for the following reason(s):   Patient has requested that this note not be shared.

## 2022-02-16 NOTE — ED QUICK NOTES
Report to [facility] crew; all paperwork handed off to [facility]. Pt belongings handed off by public safety to [facility] crew. Pt awake alert respirations unlabored NAD.

## 2023-02-16 ENCOUNTER — TELEPHONE (OUTPATIENT)
Dept: OTOLARYNGOLOGY | Facility: CLINIC | Age: 23
End: 2023-02-16

## 2023-02-16 ENCOUNTER — OFFICE VISIT (OUTPATIENT)
Dept: OTOLARYNGOLOGY | Facility: CLINIC | Age: 23
End: 2023-02-16

## 2023-02-16 VITALS — WEIGHT: 115 LBS | BODY MASS INDEX: 17 KG/M2

## 2023-02-16 DIAGNOSIS — J34.2 DEVIATED NASAL SEPTUM: Primary | ICD-10-CM

## 2023-02-16 PROCEDURE — 99203 OFFICE O/P NEW LOW 30 MIN: CPT | Performed by: OTOLARYNGOLOGY

## 2023-03-06 ENCOUNTER — TELEPHONE (OUTPATIENT)
Dept: OTOLARYNGOLOGY | Facility: CLINIC | Age: 23
End: 2023-03-06

## 2023-04-07 ENCOUNTER — TELEPHONE (OUTPATIENT)
Dept: OTOLARYNGOLOGY | Facility: CLINIC | Age: 23
End: 2023-04-07

## 2023-06-07 ENCOUNTER — TELEPHONE (OUTPATIENT)
Dept: OTOLARYNGOLOGY | Facility: CLINIC | Age: 23
End: 2023-06-07

## 2023-09-17 ENCOUNTER — WALK IN (OUTPATIENT)
Dept: URGENT CARE | Age: 23
End: 2023-09-17

## 2023-09-17 VITALS
WEIGHT: 125.9 LBS | SYSTOLIC BLOOD PRESSURE: 118 MMHG | RESPIRATION RATE: 14 BRPM | TEMPERATURE: 99.1 F | DIASTOLIC BLOOD PRESSURE: 72 MMHG | HEART RATE: 92 BPM | OXYGEN SATURATION: 99 %

## 2023-09-17 DIAGNOSIS — H60.312 DIFFUSE OTITIS EXTERNA OF LEFT EAR, UNSPECIFIED CHRONICITY: Primary | ICD-10-CM

## 2023-09-17 DIAGNOSIS — J01.40 ACUTE NON-RECURRENT PANSINUSITIS: ICD-10-CM

## 2023-09-17 PROCEDURE — 99213 OFFICE O/P EST LOW 20 MIN: CPT | Performed by: NURSE PRACTITIONER

## 2023-09-17 RX ORDER — OFLOXACIN 3 MG/ML
5 SOLUTION AURICULAR (OTIC) DAILY
Qty: 5 ML | Refills: 0 | Status: SHIPPED | OUTPATIENT
Start: 2023-09-17

## 2023-09-17 RX ORDER — CEFDINIR 300 MG/1
300 CAPSULE ORAL 2 TIMES DAILY
Qty: 20 CAPSULE | Refills: 0 | Status: SHIPPED | OUTPATIENT
Start: 2023-09-17

## 2023-11-24 ENCOUNTER — OFFICE VISIT (OUTPATIENT)
Dept: OTOLARYNGOLOGY | Facility: CLINIC | Age: 23
End: 2023-11-24
Payer: COMMERCIAL

## 2023-11-24 VITALS — HEIGHT: 69 IN | BODY MASS INDEX: 17.03 KG/M2 | WEIGHT: 115 LBS

## 2023-11-24 DIAGNOSIS — M95.0 ACQUIRED NASAL DEFORMITY: Primary | ICD-10-CM

## 2023-11-24 PROCEDURE — 3008F BODY MASS INDEX DOCD: CPT | Performed by: OTOLARYNGOLOGY

## 2023-11-24 PROCEDURE — 99214 OFFICE O/P EST MOD 30 MIN: CPT | Performed by: OTOLARYNGOLOGY

## 2023-11-24 RX ORDER — DEXTROAMPHETAMINE SACCHARATE, AMPHETAMINE ASPARTATE MONOHYDRATE, DEXTROAMPHETAMINE SULFATE AND AMPHETAMINE SULFATE 5; 5; 5; 5 MG/1; MG/1; MG/1; MG/1
CAPSULE, EXTENDED RELEASE ORAL
COMMUNITY
Start: 2023-11-21

## 2023-11-24 RX ORDER — AZELASTINE 1 MG/ML
2 SPRAY, METERED NASAL 2 TIMES DAILY
Qty: 30 ML | Refills: 3 | Status: SHIPPED | OUTPATIENT
Start: 2023-11-24

## 2023-11-24 RX ORDER — MONTELUKAST SODIUM 10 MG/1
10 TABLET ORAL NIGHTLY
Qty: 30 TABLET | Refills: 3 | Status: SHIPPED | OUTPATIENT
Start: 2023-11-24

## 2024-02-22 RX ORDER — MONTELUKAST SODIUM 10 MG/1
10 TABLET ORAL NIGHTLY
Qty: 90 TABLET | Refills: 0 | Status: SHIPPED | OUTPATIENT
Start: 2024-02-22

## 2024-02-28 RX ORDER — AZELASTINE HYDROCHLORIDE 137 UG/1
2 SPRAY, METERED NASAL 2 TIMES DAILY
Qty: 30 ML | Refills: 3 | Status: SHIPPED | OUTPATIENT
Start: 2024-02-28

## 2024-05-21 RX ORDER — MONTELUKAST SODIUM 10 MG/1
10 TABLET ORAL NIGHTLY
Qty: 90 TABLET | Refills: 0 | Status: SHIPPED | OUTPATIENT
Start: 2024-05-21

## 2024-07-13 ENCOUNTER — LAB ENCOUNTER (OUTPATIENT)
Dept: LAB | Age: 24
End: 2024-07-13
Attending: INTERNAL MEDICINE
Payer: COMMERCIAL

## 2024-07-13 ENCOUNTER — OFFICE VISIT (OUTPATIENT)
Dept: INTERNAL MEDICINE CLINIC | Facility: CLINIC | Age: 24
End: 2024-07-13
Payer: COMMERCIAL

## 2024-07-13 VITALS
WEIGHT: 121 LBS | DIASTOLIC BLOOD PRESSURE: 70 MMHG | BODY MASS INDEX: 17.92 KG/M2 | HEIGHT: 69 IN | HEART RATE: 79 BPM | SYSTOLIC BLOOD PRESSURE: 104 MMHG | OXYGEN SATURATION: 100 %

## 2024-07-13 DIAGNOSIS — Z23 NEED FOR VACCINATION: ICD-10-CM

## 2024-07-13 DIAGNOSIS — F32.A ANXIETY AND DEPRESSION: ICD-10-CM

## 2024-07-13 DIAGNOSIS — Z00.00 PHYSICAL EXAM, ANNUAL: ICD-10-CM

## 2024-07-13 DIAGNOSIS — Z00.00 PHYSICAL EXAM, ANNUAL: Primary | ICD-10-CM

## 2024-07-13 DIAGNOSIS — F41.9 ANXIETY AND DEPRESSION: ICD-10-CM

## 2024-07-13 LAB
ALBUMIN SERPL-MCNC: 5.1 G/DL (ref 3.2–4.8)
ALBUMIN/GLOB SERPL: 2 {RATIO} (ref 1–2)
ALP LIVER SERPL-CCNC: 52 U/L
ALT SERPL-CCNC: 13 U/L
ANION GAP SERPL CALC-SCNC: 6 MMOL/L (ref 0–18)
AST SERPL-CCNC: 21 U/L (ref ?–34)
BILIRUB SERPL-MCNC: 0.6 MG/DL (ref 0.3–1.2)
BUN BLD-MCNC: 11 MG/DL (ref 9–23)
BUN/CREAT SERPL: 13.8 (ref 10–20)
CALCIUM BLD-MCNC: 10.2 MG/DL (ref 8.7–10.4)
CHLORIDE SERPL-SCNC: 110 MMOL/L (ref 98–112)
CHOLEST SERPL-MCNC: 203 MG/DL (ref ?–200)
CO2 SERPL-SCNC: 26 MMOL/L (ref 21–32)
CREAT BLD-MCNC: 0.8 MG/DL
DEPRECATED RDW RBC AUTO: 41.4 FL (ref 35.1–46.3)
EGFRCR SERPLBLD CKD-EPI 2021: 106 ML/MIN/1.73M2 (ref 60–?)
ERYTHROCYTE [DISTWIDTH] IN BLOOD BY AUTOMATED COUNT: 12.6 % (ref 11–15)
FASTING PATIENT LIPID ANSWER: YES
FASTING STATUS PATIENT QL REPORTED: YES
GLOBULIN PLAS-MCNC: 2.5 G/DL (ref 2–3.5)
GLUCOSE BLD-MCNC: 84 MG/DL (ref 70–99)
HCT VFR BLD AUTO: 39.7 %
HDLC SERPL-MCNC: 98 MG/DL (ref 40–59)
HGB BLD-MCNC: 13.8 G/DL
LDLC SERPL CALC-MCNC: 93 MG/DL (ref ?–100)
MCH RBC QN AUTO: 30.9 PG (ref 26–34)
MCHC RBC AUTO-ENTMCNC: 34.8 G/DL (ref 31–37)
MCV RBC AUTO: 89 FL
NONHDLC SERPL-MCNC: 105 MG/DL (ref ?–130)
OSMOLALITY SERPL CALC.SUM OF ELEC: 293 MOSM/KG (ref 275–295)
PLATELET # BLD AUTO: 219 10(3)UL (ref 150–450)
POTASSIUM SERPL-SCNC: 4.9 MMOL/L (ref 3.5–5.1)
PROT SERPL-MCNC: 7.6 G/DL (ref 5.7–8.2)
RBC # BLD AUTO: 4.46 X10(6)UL
SODIUM SERPL-SCNC: 142 MMOL/L (ref 136–145)
TRIGL SERPL-MCNC: 68 MG/DL (ref 30–149)
TSI SER-ACNC: 1.46 MIU/ML (ref 0.55–4.78)
VLDLC SERPL CALC-MCNC: 11 MG/DL (ref 0–30)
WBC # BLD AUTO: 7.3 X10(3) UL (ref 4–11)

## 2024-07-13 PROCEDURE — 99385 PREV VISIT NEW AGE 18-39: CPT | Performed by: INTERNAL MEDICINE

## 2024-07-13 PROCEDURE — 84443 ASSAY THYROID STIM HORMONE: CPT

## 2024-07-13 PROCEDURE — 80061 LIPID PANEL: CPT

## 2024-07-13 PROCEDURE — 36415 COLL VENOUS BLD VENIPUNCTURE: CPT

## 2024-07-13 PROCEDURE — 85027 COMPLETE CBC AUTOMATED: CPT

## 2024-07-13 PROCEDURE — 90471 IMMUNIZATION ADMIN: CPT | Performed by: INTERNAL MEDICINE

## 2024-07-13 PROCEDURE — 80053 COMPREHEN METABOLIC PANEL: CPT

## 2024-07-13 PROCEDURE — 90715 TDAP VACCINE 7 YRS/> IM: CPT | Performed by: INTERNAL MEDICINE

## 2024-07-13 NOTE — PROGRESS NOTES
Darin Barton is a 23 year old female.  Chief Complaint   Patient presents with    Physical       HPI:   Patient comes for annual physical  C/C annual physical  C/o appetite is good   SEES  her psychiatrist --she wants to hold off meds   The ENT for consultation for possible septoplasty for her deviated nasal septum       HISTORY   Noted her wt -- she states she broke up with her boyfriend - and at that time did not have an appetite   She states she is eating but not sure why she isnt gaining wt   Saw dr calabrese for discharge from nipple   +constipated and takes miralax and benafiber   BM once a week - but improved with the above regimen  Thinks that she is constipated and wondering if she should be on Linzess  Also thinks that there is something wrong with her nose-possibly a deviated nasal septum     Since last visit 7/19   Saw gi and had cscope and saw urogyn for urinay freq and was sent for PT   Saw dr church -got new glasses    foot dr for ingrown toenails      Has apt with gyn this m[place] - dr sarmiento         HISTORY   here with mom za  Patient prefers mom in room  Used to see Dr. Rowell at [facility]  C/c physical  C/o blood in stool x 6 mns -- only has a BM once a yr   Its in the toilet bowl is fresh blood and also around the stool and also on the toilet paper  Sometimes constipated sometimes not inclined states that she wakes up sometimes with blood on her bed          PMH  ADHD --sees [facility] Sandor [facility]  Anxiety  All rhinitis       lives with mom , not going to school or working right now       Current Outpatient Medications   Medication Sig Dispense Refill    MONTELUKAST 10 MG Oral Tab TAKE 1 TABLET BY MOUTH EVERY DAY AT NIGHT 90 tablet 0    azelastine 137 MCG/SPRAY Nasal Solution SPRAY 2 SPRAYS BY NASAL ROUTE TWICE A DAY 30 mL 3    Amphetamine-Dextroamphet ER 20 MG Oral Capsule SR 24 Hr       loratadine-pseudoephedrine ER 5-120 MG Oral Tablet 12 Hr Take 1 tablet by mouth every 12  (twelve) hours. 60 tablet 3      Past Medical History:    ADHD    Eczema    Major depressive disorder      Past Surgical History:   Procedure Laterality Date    Colonoscopy N/A 9/4/2019    Procedure: COLONOSCOPY, POSSIBLE BIOPSY, POSSIBLE POLYPECTOMY 08923;  Surgeon: Tio Anne MD;  [facility]    Foot surgery  2005      Social History:  Social History     Socioeconomic History    Marital status: Single   Tobacco Use    Smoking status: Former     Current packs/day: 0.25     Average packs/day: 0.3 packs/day for 3.0 years (0.8 ttl pk-yrs)     Types: Cigarettes    Smokeless tobacco: Never   Vaping Use    Vaping status: Every Day    Substances: Nicotine    Devices: Disposable   Substance and Sexual Activity    Alcohol use: No    Drug use: Not Currently     Types: Cannabis     Comment: marijuana smoking once every month, last use was 2 weeks ago        REVIEW OF SYSTEMS:   GENERAL HEALTH: No fevers, chills, sweats, fatigue  VISION: No recent vision problems, blurry vision or double vision  HEENT: No decreased hearing ear pain nasal congestion or sore throat--just allergy related  SKIN: denies any unusual skin lesions or rashes  RESPIRATORY: denies shortness of breath, cough, wheezing  CARDIOVASCULAR: denies chest pain on exertion, palpitations, swelling in feet  GI: denies abdominal pain and denies heartburn, nausea or vomiting  : No Pain on urination, change in the color of urine, discharge, urinating frequently  MUS: + chronic back pain,no other  joint pain or  muscle pain  NEURO: + headaches , + anxiety, + depression-- stable     EXAM:   /70 (BP Location: Left arm, Patient Position: Sitting, Cuff Size: adult)   Pulse 79   Ht 5' 9\" (1.753 m)   Wt 121 lb (54.9 kg)   LMP 07/08/2024 (Approximate)   SpO2 100%   BMI 17.87 kg/m²   GENERAL: well developed, well nourished,in no apparent distress  SKIN: no rashes,no suspicious lesions  HEENT: atraumatic, normocephalic,ears and throat  are clear, no frontal or maxillary sinus tenderness, pupils equal reactive to light bilaterally, extra ocular muscles intact  NECK: supple,no adenopathy, nontender   LUNGS: clear to auscultation, no wheeze  CARDIO: RRR without murmur  GI: good BS's,no masses or tenderness  EXTREMITIES: no cyanosis, or edema    ASSESSMENT AND PLAN:   Diagnoses and all orders for this visit:    Physical exam, annual  -     Comp Metabolic Panel (14); Future  -     Lipid Panel; Future  -     Assay, Thyroid Stim Hormone; Future  -     CBC, Platelet; No Differential; Future    Anxiety and depression  -     OP REFERRAL TO [facility]    Need for vaccination  -     TdaP (Boostrix/Adacel) Vaccine (> 7 Y)      Advised patient to watch what she eats and exercise, seatbelt use no texting driving, sunscreen use advised  She states that she is picky about her therapist but will refer her here so at least she gets an appointment and can give a try         Preventative medicine  Labs ordered to be done once fasting  Pap- dr sarmiento 4/2022   Tdap - today        The patient indicates understanding of these issues and agrees to the plan.  No follow-ups on file.

## 2024-08-15 ENCOUNTER — TELEPHONE (OUTPATIENT)
Dept: OBGYN | Age: 24
End: 2024-08-15

## 2024-08-23 ENCOUNTER — APPOINTMENT (OUTPATIENT)
Dept: OBGYN | Age: 24
End: 2024-08-23

## 2024-08-23 VITALS
DIASTOLIC BLOOD PRESSURE: 69 MMHG | HEART RATE: 83 BPM | SYSTOLIC BLOOD PRESSURE: 102 MMHG | WEIGHT: 121.4 LBS | OXYGEN SATURATION: 100 % | HEIGHT: 69 IN | BODY MASS INDEX: 17.98 KG/M2 | TEMPERATURE: 98 F

## 2024-08-23 DIAGNOSIS — Z01.419 GYNECOLOGIC EXAM NORMAL: Primary | ICD-10-CM

## 2024-08-23 DIAGNOSIS — Z11.3 SCREEN FOR STD (SEXUALLY TRANSMITTED DISEASE): ICD-10-CM

## 2024-08-23 ASSESSMENT — PATIENT HEALTH QUESTIONNAIRE - PHQ9
1. LITTLE INTEREST OR PLEASURE IN DOING THINGS: NOT AT ALL
CLINICAL INTERPRETATION OF PHQ2 SCORE: NO FURTHER SCREENING NEEDED
2. FEELING DOWN, DEPRESSED OR HOPELESS: NOT AT ALL
SUM OF ALL RESPONSES TO PHQ9 QUESTIONS 1 AND 2: 0
SUM OF ALL RESPONSES TO PHQ9 QUESTIONS 1 AND 2: 0

## 2024-08-23 ASSESSMENT — ENCOUNTER SYMPTOMS
CONSTITUTIONAL NEGATIVE: 1
GASTROINTESTINAL NEGATIVE: 1

## 2024-08-26 ENCOUNTER — TELEPHONE (OUTPATIENT)
Dept: INTERNAL MEDICINE CLINIC | Facility: CLINIC | Age: 24
End: 2024-08-26

## 2024-08-26 LAB
C TRACH RRNA CVX QL NAA+PROBE: NEGATIVE
Lab: NORMAL
Lab: NORMAL
N GONORRHOEA RRNA CVX QL NAA+PROBE: NEGATIVE
T VAGINALIS RRNA CVX QL NAA+PROBE: NEGATIVE

## 2024-08-27 LAB — HOLD SPECIMEN: NORMAL

## 2024-08-28 RX ORDER — MONTELUKAST SODIUM 10 MG/1
10 TABLET ORAL NIGHTLY
Qty: 90 TABLET | Refills: 0 | Status: SHIPPED | OUTPATIENT
Start: 2024-08-28

## 2024-09-02 LAB
CASE RPRT: NORMAL
CYTOLOGY CVX/VAG STUDY: NORMAL
PAP EDUCATIONAL NOTE: NORMAL
SPECIMEN ADEQUACY: NORMAL

## 2024-10-03 ENCOUNTER — OFFICE VISIT (OUTPATIENT)
Dept: ALLERGY | Facility: CLINIC | Age: 24
End: 2024-10-03
Payer: COMMERCIAL

## 2024-10-03 ENCOUNTER — NURSE ONLY (OUTPATIENT)
Dept: ALLERGY | Facility: CLINIC | Age: 24
End: 2024-10-03

## 2024-10-03 DIAGNOSIS — Z91.040 LATEX ALLERGY: ICD-10-CM

## 2024-10-03 DIAGNOSIS — J32.2 CHRONIC ETHMOIDAL SINUSITIS: ICD-10-CM

## 2024-10-03 DIAGNOSIS — H10.10 SEASONAL AND PERENNIAL ALLERGIC RHINOCONJUNCTIVITIS: Primary | ICD-10-CM

## 2024-10-03 DIAGNOSIS — Z91.018 FOOD ALLERGY: ICD-10-CM

## 2024-10-03 DIAGNOSIS — Z23 FLU VACCINE NEED: ICD-10-CM

## 2024-10-03 DIAGNOSIS — T78.1XXA ADVERSE FOOD REACTION, INITIAL ENCOUNTER: ICD-10-CM

## 2024-10-03 DIAGNOSIS — J30.89 SEASONAL AND PERENNIAL ALLERGIC RHINOCONJUNCTIVITIS: Primary | ICD-10-CM

## 2024-10-03 DIAGNOSIS — J30.2 SEASONAL AND PERENNIAL ALLERGIC RHINOCONJUNCTIVITIS: Primary | ICD-10-CM

## 2024-10-03 DIAGNOSIS — Z23 NEED FOR COVID-19 VACCINE: ICD-10-CM

## 2024-10-03 PROCEDURE — 95004 PERQ TESTS W/ALRGNC XTRCS: CPT | Performed by: ALLERGY & IMMUNOLOGY

## 2024-10-03 PROCEDURE — 99204 OFFICE O/P NEW MOD 45 MIN: CPT | Performed by: ALLERGY & IMMUNOLOGY

## 2024-10-03 PROCEDURE — 95024 IQ TESTS W/ALLERGENIC XTRCS: CPT | Performed by: ALLERGY & IMMUNOLOGY

## 2024-10-03 RX ORDER — LEVOCETIRIZINE DIHYDROCHLORIDE 5 MG/1
5 TABLET, FILM COATED ORAL EVERY EVENING
Qty: 90 TABLET | Refills: 1 | Status: SHIPPED | OUTPATIENT
Start: 2024-10-03

## 2024-10-03 NOTE — PROGRESS NOTES
Patient is a 23 old female who presents for allergy Darinroseanna Barton is a 23 year old female.    HPI:   No chief complaint on file.  Skin testing today to latex was    Patient is a 22-year-old female who presents for allergy consultation upon referral of her ENT, Dr. Hunter Zambrano with a chief complaint of concern for allergies   Prior note from visit with ENT reviewed and appreciated noting a nasal septal deviation allergic rhinitis    Medication list includes Singulair Astelin Claritin-D 12-Hour    Immunizations reviewed.  COVID-vaccine x 3 doses last in 2021  Last flu vaccine from January 2021    Today patient reports      Allergies  Duration: 10 years  Timing: YR   Worse in [place]   Symptoms: runny nose, sz ,   Severity: moderate   Status:  no change   Triggers:  allergies  Tried: Singulair (stopped due to concern ofr sind effects)Astelin Claritin-D 12-Hour  Meds: Claritin astelin    Pets : 2 dog, 3 cats   Nonsmoker        Hx of chronic sinusitis   To have sinus sx in future  at [employer] , nsr, and sinus sx     Hx of asthma, ad, or food allergy:   History of eczema.    Foods? Dairy  ,gluten:  Dairy : gi issues, upset stomach, bloat, gas pain, diarrhea or constipation  Gluten: ?  Not as bad , nausea,   No rashes, hives or resp issus with foods     Latex? Condoms: itchy and swelling   Better with latex . Minutes,     Ct sinus at [employer]  October 1, 2024 per care everywhere report  noted diffuse sinus mucosal thickening in the maxillary and ethmoid sinuses.    Ref Range & Units 9/10/24 12:56 PM   A. ALTERNATA, IGE  <0.10 kU/L <0.10   A. ALTERNATA CLASS CLASS 0   ASPERGILLUS FUMIGATUS, IGE  <0.10 kU/L <0.10   ASPERGILLUS FUMIGATUS CLASS CLASS 0   BERMUDA GRASS, IGE  <0.10 kU/L <0.10   BERMUDA GRASS CLASS CLASS 0   SILVER BIRCH, IGE  <0.10 kU/L <0.10   SILVER BIRCH CLASS CLASS 0   CAT DANDER, IGE  <0.10 kU/L 1.37 High    CAT DANDER CLASS CLASS 2   CLADOSPORIUM HERBARUM, IGE  <0.10 kU/L <0.10   CLADOSPORIUM HERBARUM CLASS  CLASS 0   COCKROACH, Slovak IGE  <0.10 kU/L <0.10   COCKROACH, Slovak CLASS CLASS 0   COMMON RAGWEED, IGE  <0.10 kU/L <0.10   COMMON RAGWEED CLASS CLASS 0   COTTONWOOD, IGE  <0.10 kU/L <0.10   COTTONWOOD CLASS CLASS 0   DERM. FARINAE, IGE  <0.10 kU/L 0.36 High    DERM. FARINAE CLASS CLASS 1   D. PTERONYSSINUS, IGE  <0.10 kU/L 0.34 High    D. PTERONYSSINUS CLASS CLASS 0/1   DOG DANDER, IGE  <0.10 kU/L 0.14 High    DOG DANDER CLASS CLASS 0/1   ELM, IGE  <0.10 kU/L <0.10   ELM CLASS CLASS 0   MAPLE (BOX ELDER), IGE  <0.10 kU/L 0.12 High    MAPLE (BOX ELDER) CLASS CLASS 0/1   MAPLE LEAF SYCAMORE, IGE  <0.10 kU/L <0.10   MAPLE LEAF SYCAMORE CLASS CLASS 0   MOUNTAIN JUNIPER, IGE  <0.10 kU/L <0.10   MOUNTAIN JUNIPER CLASS CLASS 0   MOUSE URINE PROTEIN,IGE  <0.10 kU/L <0.10   MOUSE URINE PROTEIN CLASS CLASS 0   OAK, IGE  <0.10 kU/L <0.10   OAK CLASS CLASS 0   PECAN, HICKORY IGE  <0.10 kU/L <0.10   PECAN, HICKORY CLASS CLASS 0   PENICILLIUM CHRYSOGENUM, IGE  <0.10 kU/L <0.10   PENICILLIUM CHRYSOGENUM CLASS CLASS 0   COMMON PIGWEED, IGE  <0.10 kU/L <0.10   COMMON PIGWEED CLASS CLASS 0   SALTWORT, IGE  <0.10 kU/L <0.10   SALTWORT CLASS CLASS 0   SHEEP SORREL, IGE  <0.10 kU/L <0.10   SHEEP SORREL CLASS CLASS 0   AMANDA GRASS, IGE  <0.10 kU/L <0.10   AMANDA GRASS CLASS CLASS 0   WALNUT TREE, IGE  <0.10 kU/L <0.10   WALNUT TREE CLASS CLASS 0   WHITE KRISTY, IGE  <0.10 kU/L <0.10   WHITE KRISTY CLASS CLASS 0   MULBERRY, IGE  <0.10 kU/L <0.10   MULBERRY CLASS CLASS 0   IGE  <114.0 IU/mL 27.5   ALLERGY INTERPRETATION See Below   Comment: (NOTE)                         Level of Allergen  CLASS   kU/L            Specific IgE Antibody  -----   -----------     ---------------------  0       <0.10           Undetectable  0/1     0.10 - 0.34     Very Low Level  1       0.35 - 0.69     Low Level  2       0.70 - 3.49     Moderate Level  3       3.50 - 17.4     High Level  4       17.5 - 49.9     Very High Level  5       50.0 - 100.0    Very  High Level  6       >100.0          Very High Level         HISTORY:  Past Medical History:    ADHD    Eczema    Major depressive disorder      Past Surgical History:   Procedure Laterality Date    Colonoscopy N/A 9/4/2019    Procedure: COLONOSCOPY, POSSIBLE BIOPSY, POSSIBLE POLYPECTOMY 84710;  Surgeon: Tio Anne MD;  [facility]    Foot surgery  2005      Family History   Problem Relation Age of Onset    Other (Migraines) Mother     High Cholesterol Maternal Grandmother     Glaucoma Maternal Grandmother     Asthma Other         Mom's side    Diabetes Maternal Grandfather     Cancer Maternal Aunt 40        Thyroid      Social History:   Social History     Socioeconomic History    Marital status: Single   Tobacco Use    Smoking status: Former     Current packs/day: 0.25     Average packs/day: 0.3 packs/day for 3.0 years (0.8 ttl pk-yrs)     Types: Cigarettes    Smokeless tobacco: Never   Vaping Use    Vaping status: Every Day    Substances: Nicotine    Devices: Disposable   Substance and Sexual Activity    Alcohol use: No    Drug use: Not Currently     Types: Cannabis     Comment: marijuana smoking once every month, last use was 2 weeks ago     Social Determinants of Health     Financial Resource Strain: Low Risk  (9/6/2024)    Received from [facility]    Overall Financial Resource Strain (CARDIA)     Difficulty of Paying Living Expenses: Not hard at all   Food Insecurity: No Food Insecurity (9/6/2024)    Received from [facility]    Hunger Vital Sign     Worried About Running Out of Food in the Last Year: Never true     Ran Out of Food in the Last Year: Never true   Transportation Needs: No Transportation Needs (9/6/2024)    Received from [facility]    PRAPARE - Transportation     Lack of Transportation (Medical): No     Lack of Transportation (Non-Medical): No   Housing Stability: Low Risk  (9/6/2024)    Received from  [facility]    Housing Stability Vital Sign     Unable to Pay for Housing in the Last Year: No     Number of Places Lived in the Last Year: 1     Unstable Housing in the Last Year: No        Medications (Active prior to today's visit):  Current Outpatient Medications   Medication Sig Dispense Refill    MONTELUKAST 10 MG Oral Tab TAKE 1 TABLET BY MOUTH EVERY DAY AT NIGHT 90 tablet 0    azelastine 137 MCG/SPRAY Nasal Solution SPRAY 2 SPRAYS BY NASAL ROUTE TWICE A DAY 30 mL 3    Amphetamine-Dextroamphet ER 20 MG Oral Capsule SR 24 Hr       loratadine-pseudoephedrine ER 5-120 MG Oral Tablet 12 Hr Take 1 tablet by mouth every 12 (twelve) hours. 60 tablet 3       Allergies:  No Known Allergies      ROS:     Allergic/Immuno:  See HPI  Cardiovascular:  Negative for irregular heartbeat/palpitations, chest pain, edema  Constitutional:  Negative night sweats,weight loss, irritability and lethargy  Endocrine:  Negative for cold intolerance, polydipsia and polyphagia  ENMT:  Negative for ear drainage, hearing loss and nasal drainage  Eyes:  Negative for eye discharge and vision loss  Gastrointestinal:  Negative for abdominal pain, diarrhea and vomiting  Genitourinary:  Negative for dysuria and hematuria  Hema/Lymph:  Negative for easy bleeding and easy bruising  Integumentary:  Negative for pruritus and rash  Musculoskeletal:  Negative for joint symptoms  Neurological:  Negative for dizziness, seizures  Psychiatric:  Negative for inappropriate interaction and psychiatric symptoms  Respiratory:  Negative for cough, dyspnea and wheezing      PHYSICAL EXAM:   Constitutional: responsive, no acute distress noted  Head/Face: NC/Atraumatic  Eyes/Vision: conjunctiva and lids are normal extraocular motion is intact   Ears/Audiometry: tympanic membranes are normal bilaterally hearing is grossly intact  Nose/Mouth/Throat: nose and throat are clear mucous membranes are moist   Neck/Thyroid: neck is supple without  adenopathy  Lymphatic: no abnormal cervical, supraclavicular or axillary adenopathy is noted  Respiratory: normal to inspection lungs are clear to auscultation bilaterally normal respiratory effort   Cardiovascular: regular rate and rhythm no murmurs, gallups, or rubs  Abdomen: soft non-tender non-distended  Skin/Hair: no unusual rashes present  Extremities: no edema, cyanosis, or clubbing  Neurological:Oriented to time, place, person & situation       ASSESSMENT/PLAN:   Assessment   Encounter Diagnoses   Name Primary?    Seasonal and perennial allergic rhinoconjunctivitis Yes    Other atopic dermatitis     Flu vaccine need     Need for COVID-19 vaccine      Skin testing today to common indoor and outdoor environmental allergies was positive to grass weeds and dust mites     Skin testing today to common food allergens including milk egg white egg yolk wheat soy almond walnut peanut was negative    Skin testing today to latex via prick through a latex glove was negative    Positive histamine control    1.  Allergic rhinitis  See above skin testing to screen for allergic triggers.  Followed by ENT at [facility].  Patient to have sinus surgery before the end of the year at [facility] and for chronic sinusitis.  CT of sinuses from October 1, 2024 at [facility] reviewed  Currently using Astelin and Claritin  May consider Xyzal 5 mg in place of Claritin as an antihistamine  May continue with Astelin 2 sprays per nostril twice a day as an antihistamine nasal spray  May add Flonase 2 sprays per nostril as a nasal steroid spray.  Defers considering Singulair due to concern for potential side effects.    #2 chronic sinusitis.  Followed by ENT.  To have sinus surgery for the end of the year.  See above #1.    3.  Flu vaccine recommended this fall.  Please check with local pharmacy as we have yet to stock the vaccine    #4 COVID-vaccine booster recommended.  New booster available as of this past month.  Please check with local pharmacy as  we do not carry this vaccine in office    #5 Food allergy versus adverse food reaction.  Symptoms with milk and more so than with wheat or gluten.  See above skin testing to screen for an IgE mediated food allergy  Reviewed potential lactose intolerance.  If skin testing is negative for food allergy and the most likely lactose intolerance.  Reviewed potential trial of Lactaid milk or fair life milk which is lactose-free or milk alternative such as oat milk or almond milk    #6 latex  Localized symptoms after exposure to condoms in the past.  Denies respiratory issues.  Symptoms typically within minutes of exposure.  See above skin testing to latex to further evaluate.  Reviewed potential patch testing in the future to screen for allergic contact dermatitis.  Patient currently avoiding latex condoms and tolerating alternatives without issues                   Orders This Visit:  No orders of the defined types were placed in this encounter.      Meds This Visit:  Requested Prescriptions      No prescriptions requested or ordered in this encounter       Imaging & Referrals:  None     10/3/2024  Yazan Yanez MD      If medication samples were provided today, they were provided solely for patient education and training related to self administration of these medications.  Teaching, instruction and sample was provided to the patient by myself.  Teaching included  a review of potential adverse side effects as well as potential efficacy.  Patient's questions were answered in regards to medication administration and dosing and potential side effects. Teaching was provided via the teach back method

## 2024-10-03 NOTE — PATIENT INSTRUCTIONS
1.  Allergic rhinitis  See above skin testing to screen for allergic triggers.  Followed by ENT at [place].  Patient to have sinus surgery before the end of the year at [place] and for chronic sinusitis.  CT of sinuses from October 1, 2024 at [place] reviewed  Currently using Astelin and Claritin  May consider Xyzal 5 mg in place of Claritin as an antihistamine  May continue with Astelin 2 sprays per nostril twice a day as an antihistamine nasal spray  May add Flonase 2 sprays per nostril as a nasal steroid spray.  Defers considering Singulair due to concern for potential side effects.    #2 chronic sinusitis.  Followed by ENT.  To have sinus surgery for the end of the year.  See above #1.    3.  Flu vaccine recommended this fall.  Please check with local pharmacy as we have yet to stock the vaccine    #4 COVID-vaccine booster recommended.  New booster available as of this past month.  Please check with local pharmacy as we do not carry this vaccine in office    #5 Food allergy versus adverse food reaction.  Symptoms with milk and more so than with wheat or gluten.  See above skin testing to screen for an IgE mediated food allergy  Reviewed potential lactose intolerance.  If skin testing is negative for food allergy and the most likely lactose intolerance.  Reviewed potential trial of Lactaid milk or fair life milk which is lactose-free or milk alternative such as oat milk or almond milk    #6 latex  Localized symptoms after exposure to condoms in the past.  Denies respiratory issues.  Symptoms typically within minutes of exposure.  See above skin testing to latex to further evaluate.  Reviewed potential patch testing in the future to screen for allergic contact dermatitis.  Patient currently avoiding latex condoms and tolerating alternatives without issues                   Orders This Visit:  No orders of the defined types were placed in this encounter.

## 2025-05-13 ENCOUNTER — OFFICE VISIT (OUTPATIENT)
Dept: INTERNAL MEDICINE CLINIC | Facility: CLINIC | Age: 25
End: 2025-05-13
Payer: COMMERCIAL

## 2025-05-13 VITALS
OXYGEN SATURATION: 99 % | DIASTOLIC BLOOD PRESSURE: 71 MMHG | BODY MASS INDEX: 18 KG/M2 | HEART RATE: 73 BPM | SYSTOLIC BLOOD PRESSURE: 104 MMHG | WEIGHT: 124.63 LBS

## 2025-05-13 DIAGNOSIS — R09.81 NASAL CONGESTION: ICD-10-CM

## 2025-05-13 DIAGNOSIS — Z01.818 PREOP EXAMINATION: Primary | ICD-10-CM

## 2025-05-13 PROCEDURE — 99213 OFFICE O/P EST LOW 20 MIN: CPT | Performed by: INTERNAL MEDICINE

## 2025-05-13 NOTE — PROGRESS NOTES
Darin Barton is a 24 year old female.  Chief Complaint   Patient presents with    Pre-Op Exam     SEPTOPLASTY, BILATERAL INFERIOR TURBINATE SUBMUCOUS RESECTION - 6/5/25       HPI:   Pre op  C/c plasty bilateral inferior turbinate Submucous resection on June 5, 2025  C/o planning for septoplasty surg next mn at [facility] by dr mani rosenbaum fax [phone]--  Stopped adderal a few mns ago , stopped seeing the psychiatrist as well but doing well    she states that she can walk up 2 flights of stairs without feeling short of breath  Her most recent surgery was when she was a kid for her toe and also recently had a colonoscopy 5 a few years ago      HISTORY  Sees  her psychiatrist --she wants to hold off meds   The ENT for consultation for possible septoplasty for her deviated nasal septum         HISTORY   Noted her wt -- she states she broke up with her boyfriend - and at that time did not have an appetite   She states she is eating but not sure why she isnt gaining wt   Saw dr calabrese for discharge from nipple   +constipated and takes miralax and benafiber   BM once a week - but improved with the above regimen  Thinks that she is constipated and wondering if she should be on Linzess  Also thinks that there is something wrong with her nose-possibly a deviated nasal septum     Since last visit 7/19   Saw gi and had cscope and saw urogyn for urinay freq and was sent for PT   Saw dr church -got new glasses    foot dr for ingrown toenails      Has apt with gyn this mn - dr sarmiento         HISTORY   here with mom za  Patient prefers mom in room  Used to see Dr. Rowell at [facility]  C/c physical  C/o blood in stool x 6 mns -- only has a BM once a yr   Its in the toilet bowl is fresh blood and also around the stool and also on the toilet paper  Sometimes constipated sometimes not inclined states that she wakes up sometimes with blood on her bed           PM  ADHD --sees Dr. Sandor Foster at [facility]  Anxiety  All rhinitis       lives with mom , not going to school or working right now   ----------------------------------------------------------------------------------------------    Current Medications[1]   Past Medical History[2]   Past Surgical History[3]   Social History:  Short Social Hx on File[4]     REVIEW OF SYSTEMS:   GENERAL HEALTH: No fevers, chills, sweats, fatigue  VISION: No recent vision problems, blurry vision or double vision  HEENT: No decreased hearing ear pain + nasal congestion , no  sore throat  SKIN: denies any unusual skin lesions or rashes  RESPIRATORY: denies shortness of breath, cough, wheezing  CARDIOVASCULAR: denies chest pain on exertion, palpitations, swelling in feet  GI: denies abdominal pain and denies heartburn, nausea or vomiting  : No Pain on urination, change in the color of urine, discharge, urinating frequently  MUS: +  back pain,no  joint pain, no muscle pain  NEURO: denies headaches , + anxiety- stable , no depression    EXAM:   /71   Pulse 73   Wt 124 lb 9.6 oz (56.5 kg)   LMP 07/08/2024 (Approximate)   SpO2 99%   BMI 18.40 kg/m²   GENERAL: well developed, well nourished,in no apparent distress  SKIN: no rashes,no suspicious lesions  HEENT: atraumatic, normocephalic   NECK: supple,no adenopathy, full range of motion of the neck  LUNGS: clear to auscultation, no wheeze  CARDIO: RRR without murmur  EXTREMITIES: no cyanosis, or edema    ASSESSMENT AND PLAN:   Diagnoses and all orders for this visit:    Preop examination  -     Comp Metabolic Panel (14); Future  -     CBC, Platelet; No Differential; Future  -     Prothrombin Time (PT); Future  -     PTT, Activated; Future  -     Pregnancy Test, Urine [E]; Future    Nasal congestion  -     Comp Metabolic Panel (14); Future  -     CBC, Platelet; No Differential; Future  -     Prothrombin Time (PT); Future  -     PTT, Activated; Future  -     Pregnancy Test, Urine [E]; Future      Labs ordered   Patient  with good exercise tolerance          Preventative medicine  Labs ordered to be done once fasting  Pap- dr sarmiento 4/2022 and 8/23/2024   Tdap - utd      The patient indicates understanding of these issues and agrees to the plan.  No follow-ups on file.         [1]   Current Outpatient Medications   Medication Sig Dispense Refill    azelastine 137 MCG/SPRAY Nasal Solution SPRAY 2 SPRAYS BY NASAL ROUTE TWICE A DAY 30 mL 3   [2]   Past Medical History:   ADHD    Eczema    Major depressive disorder   [3]   Past Surgical History:  Procedure Laterality Date    Colonoscopy N/A 9/4/2019    Procedure: COLONOSCOPY, POSSIBLE BIOPSY, POSSIBLE POLYPECTOMY 49979;  Surgeon: Tio Anne MD;  Location[facility], [facility]    Foot surgery  2005   [4]   Social History  Socioeconomic History    Marital status: Single   Tobacco Use    Smoking status: Former     Current packs/day: 0.25     Average packs/day: 0.3 packs/day for 3.0 years (0.8 ttl pk-yrs)     Types: Cigarettes    Smokeless tobacco: Never   Vaping Use    Vaping status: Every Day    Substances: Nicotine    Devices: Disposable   Substance and Sexual Activity    Alcohol use: Yes     Comment: socially    Drug use: Not Currently     Types: Cannabis     Comment: marijuana smoking once every month, last use was 2 weeks ago     Social Drivers of Health     Food Insecurity: No Food Insecurity (9/6/2024)    Received from [facility]    Hunger Vital Sign     Worried About Running Out of Food in the Last Year: Never true     Ran Out of Food in the Last Year: Never true   Transportation Needs: No Transportation Needs (9/6/2024)    Received from [facility]    PRAPARE - Transportation     Lack of Transportation (Medical): No     Lack of Transportation (Non-Medical): No   Housing Stability: Low Risk  (9/6/2024)    Received from [facility]    Housing Stability Vital Sign     Unable to Pay for Housing in the Last Year: No      Number of Places Lived in the Last Year: 1     Unstable Housing in the Last Year: No

## 2025-05-19 ENCOUNTER — LAB ENCOUNTER (OUTPATIENT)
Dept: LAB | Age: 25
End: 2025-05-19
Attending: INTERNAL MEDICINE
Payer: COMMERCIAL

## 2025-05-19 DIAGNOSIS — Z01.818 PREOP EXAMINATION: ICD-10-CM

## 2025-05-19 DIAGNOSIS — R09.81 NASAL CONGESTION: ICD-10-CM

## 2025-05-19 LAB
ALBUMIN SERPL-MCNC: 5.1 G/DL (ref 3.2–4.8)
ALBUMIN/GLOB SERPL: 2.2 {RATIO} (ref 1–2)
ALP LIVER SERPL-CCNC: 49 U/L (ref 37–98)
ALT SERPL-CCNC: 18 U/L (ref 10–49)
ANION GAP SERPL CALC-SCNC: 8 MMOL/L (ref 0–18)
APTT PPP: 36.6 SECONDS (ref 23–36)
AST SERPL-CCNC: 23 U/L (ref ?–34)
BILIRUB SERPL-MCNC: 0.5 MG/DL (ref 0.3–1.2)
BUN BLD-MCNC: 9 MG/DL (ref 9–23)
BUN/CREAT SERPL: 11.1 (ref 10–20)
CALCIUM BLD-MCNC: 9.7 MG/DL (ref 8.7–10.4)
CHLORIDE SERPL-SCNC: 105 MMOL/L (ref 98–112)
CO2 SERPL-SCNC: 25 MMOL/L (ref 21–32)
CREAT BLD-MCNC: 0.81 MG/DL (ref 0.55–1.02)
DEPRECATED RDW RBC AUTO: 41.2 FL (ref 35.1–46.3)
EGFRCR SERPLBLD CKD-EPI 2021: 104 ML/MIN/1.73M2 (ref 60–?)
ERYTHROCYTE [DISTWIDTH] IN BLOOD BY AUTOMATED COUNT: 12.6 % (ref 11–15)
FASTING STATUS PATIENT QL REPORTED: YES
GLOBULIN PLAS-MCNC: 2.3 G/DL (ref 2–3.5)
GLUCOSE BLD-MCNC: 93 MG/DL (ref 70–99)
HCT VFR BLD AUTO: 38.8 % (ref 35–48)
HGB BLD-MCNC: 12.7 G/DL (ref 12–16)
INR BLD: 0.89 (ref 0.8–1.2)
MCH RBC QN AUTO: 29.1 PG (ref 26–34)
MCHC RBC AUTO-ENTMCNC: 32.7 G/DL (ref 31–37)
MCV RBC AUTO: 88.8 FL (ref 80–100)
OSMOLALITY SERPL CALC.SUM OF ELEC: 284 MOSM/KG (ref 275–295)
PLATELET # BLD AUTO: 210 10(3)UL (ref 150–450)
POTASSIUM SERPL-SCNC: 4.5 MMOL/L (ref 3.5–5.1)
PROT SERPL-MCNC: 7.4 G/DL (ref 5.7–8.2)
PROTHROMBIN TIME: 12.6 SECONDS (ref 11.6–14.8)
RBC # BLD AUTO: 4.37 X10(6)UL (ref 3.8–5.3)
SODIUM SERPL-SCNC: 138 MMOL/L (ref 136–145)
WBC # BLD AUTO: 4.3 X10(3) UL (ref 4–11)

## 2025-05-19 PROCEDURE — 85730 THROMBOPLASTIN TIME PARTIAL: CPT

## 2025-05-19 PROCEDURE — 80053 COMPREHEN METABOLIC PANEL: CPT

## 2025-05-19 PROCEDURE — 36415 COLL VENOUS BLD VENIPUNCTURE: CPT

## 2025-05-19 PROCEDURE — 85610 PROTHROMBIN TIME: CPT

## 2025-05-19 PROCEDURE — 85027 COMPLETE CBC AUTOMATED: CPT

## 2025-05-21 ENCOUNTER — TELEPHONE (OUTPATIENT)
Dept: INTERNAL MEDICINE CLINIC | Facility: CLINIC | Age: 25
End: 2025-05-21

## 2025-05-21 NOTE — TELEPHONE ENCOUNTER
please see CAREN Helms message below.  Patient also called and wanted to inform you that she had her blood work done on Monday. Per patient this is for her preop clearance. Thank you.

## 2025-05-28 ENCOUNTER — TELEPHONE (OUTPATIENT)
Dept: INTERNAL MEDICINE CLINIC | Facility: CLINIC | Age: 25
End: 2025-05-28

## 2025-05-28 NOTE — TELEPHONE ENCOUNTER
Patient called, verified Name and . States she was seen in the office for pre-op. Surgery is on  for septoplasty with Dr. Danielle Myers. She was told that pre-op clearance has not been received yet.    Pre-op clearance note successfully faxed to [phone] via [employer] at 2:42.

## (undated) NOTE — MR AVS SNAPSHOT
[address] [facility] [address]  [place] [address]  [phone]               Thank you for choosing us for your health care visit with BERNICE Loomis.   We are glad to serve you and happy to provide you with this summary of your vis Assoc Dx:  Pharyngitis, unspecified etiology [J02.9]           Rapid Mono test    Complete by:  As directed    Assoc Dx:  Myalgia [M79.1]                 Results of Recent Testing     STREP A ASSAY W/OPTIC      Component Value Standard Range & Units    ST o creating a rainbow shopping list to find colorful fruits and vegetables  o go on a walking scavenger hunt through the neighborhood   o grow a family garden    In addition to 5, 4, 3, 2, 1 families can make small changes in their family routines to help e

## (undated) NOTE — ED AVS SNAPSHOT
Hair Hsu   MRN: [id number]    Department:  [facility] Emergency Department   Date of Visit:  12/18/2018           Disclosure     Insurance plans vary and the physician(s) referred by the ER may not be covered by your plan.  Please cont CARE PHYSICIAN AT ONCE OR RETURN IMMEDIATELY TO THE EMERGENCY DEPARTMENT. If you have been prescribed any medication(s), please fill your prescription right away and begin taking the medication(s) as directed.   If you believe that any of the medications

## (undated) NOTE — LETTER
08/23/19        [address] [place] [id number]      Dear Kae Mcmillan,    [facility] records indicate that you have outstanding lab work and or testing that was ordered for you and has not yet been completed:  Orders Placed This Encou

## (undated) NOTE — Clinical Note
Talisha Aldridge I saw Archana Situ today with mixed pelvic floor sx. I've recommended pelvic floor PT. I will work to manage her sx. I appreciate the opportunity to participate in her care.  Thanks, [employer]

## (undated) NOTE — Clinical Note
Nika Larson - I saw Tyler Albert today with dyspareunia. I've recommended vag valium and pelvic floor PT. She's coming back to see you. We will work to manage her sx. I appreciate the opportunity to participate in her care.  Thanks, [employer]